# Patient Record
Sex: MALE | Race: WHITE | NOT HISPANIC OR LATINO | Employment: OTHER | URBAN - METROPOLITAN AREA
[De-identification: names, ages, dates, MRNs, and addresses within clinical notes are randomized per-mention and may not be internally consistent; named-entity substitution may affect disease eponyms.]

---

## 2023-07-07 DIAGNOSIS — F41.9 ANXIETY: Primary | ICD-10-CM

## 2023-07-07 DIAGNOSIS — K21.9 GASTROESOPHAGEAL REFLUX DISEASE WITHOUT ESOPHAGITIS: ICD-10-CM

## 2023-07-07 DIAGNOSIS — R35.0 BENIGN PROSTATIC HYPERPLASIA WITH URINARY FREQUENCY: ICD-10-CM

## 2023-07-07 DIAGNOSIS — I10 PRIMARY HYPERTENSION: ICD-10-CM

## 2023-07-07 DIAGNOSIS — N40.1 BENIGN PROSTATIC HYPERPLASIA WITH URINARY FREQUENCY: ICD-10-CM

## 2023-07-07 DIAGNOSIS — E78.2 MIXED HYPERLIPIDEMIA: ICD-10-CM

## 2023-07-07 RX ORDER — ESCITALOPRAM OXALATE 20 MG/1
20 TABLET ORAL DAILY
Qty: 90 TABLET | Refills: 3 | Status: SHIPPED | OUTPATIENT
Start: 2023-07-07 | End: 2024-07-06

## 2023-07-07 RX ORDER — ATENOLOL 25 MG/1
TABLET ORAL
Qty: 90 TABLET | Refills: 3 | Status: SHIPPED | OUTPATIENT
Start: 2023-07-07 | End: 2024-04-01

## 2023-07-07 RX ORDER — OMEPRAZOLE 20 MG/1
20 CAPSULE, DELAYED RELEASE ORAL DAILY
Qty: 90 CAPSULE | Refills: 3 | Status: SHIPPED | OUTPATIENT
Start: 2023-07-07 | End: 2024-04-01

## 2023-07-07 RX ORDER — ATORVASTATIN CALCIUM 80 MG/1
TABLET, FILM COATED ORAL
Qty: 90 TABLET | Refills: 3 | Status: SHIPPED | OUTPATIENT
Start: 2023-07-07 | End: 2024-04-01

## 2023-07-07 RX ORDER — TAMSULOSIN HYDROCHLORIDE 0.4 MG/1
0.4 CAPSULE ORAL DAILY
Qty: 90 CAPSULE | Refills: 3 | Status: SHIPPED | OUTPATIENT
Start: 2023-07-07 | End: 2024-04-01

## 2023-07-07 RX ORDER — FINASTERIDE 5 MG/1
5 TABLET, FILM COATED ORAL DAILY
Qty: 90 TABLET | Refills: 3 | Status: SHIPPED | OUTPATIENT
Start: 2023-07-07 | End: 2024-04-01

## 2023-07-21 ENCOUNTER — APPOINTMENT (OUTPATIENT)
Dept: PRIMARY CARE | Facility: CLINIC | Age: 83
End: 2023-07-21
Payer: MEDICARE

## 2023-09-07 DIAGNOSIS — F41.9 ANXIETY: Primary | ICD-10-CM

## 2023-09-07 RX ORDER — TRAZODONE HYDROCHLORIDE 50 MG/1
50-100 TABLET ORAL NIGHTLY PRN
Qty: 180 TABLET | Refills: 3 | Status: SHIPPED | OUTPATIENT
Start: 2023-09-07

## 2023-09-14 DIAGNOSIS — I10 PRIMARY HYPERTENSION: Primary | ICD-10-CM

## 2023-09-14 PROBLEM — M25.562 LEFT KNEE PAIN: Status: ACTIVE | Noted: 2023-09-14

## 2023-09-14 PROBLEM — F33.1 MAJOR DEPRESSIVE DISORDER, RECURRENT, MODERATE (MULTI): Status: ACTIVE | Noted: 2023-09-14

## 2023-09-14 PROBLEM — R21 SKIN RASH: Status: ACTIVE | Noted: 2023-09-14

## 2023-09-14 PROBLEM — F41.9 ANXIETY: Status: ACTIVE | Noted: 2023-09-14

## 2023-09-14 PROBLEM — H10.9 CONJUNCTIVITIS: Status: ACTIVE | Noted: 2023-09-14

## 2023-09-14 PROBLEM — H52.203 ASTIGMATISM OF BOTH EYES: Status: ACTIVE | Noted: 2023-09-14

## 2023-09-14 PROBLEM — R53.83 FATIGUE: Status: ACTIVE | Noted: 2023-09-14

## 2023-09-14 PROBLEM — H52.01 HYPERMETROPIA OF RIGHT EYE: Status: ACTIVE | Noted: 2023-09-14

## 2023-09-14 PROBLEM — H35.361 DRUSEN OF RIGHT MACULA: Status: ACTIVE | Noted: 2023-09-14

## 2023-09-14 PROBLEM — M54.9 MID BACK PAIN: Status: ACTIVE | Noted: 2023-09-14

## 2023-09-14 PROBLEM — Z98.61 CAD S/P PERCUTANEOUS CORONARY ANGIOPLASTY: Status: ACTIVE | Noted: 2023-09-14

## 2023-09-14 PROBLEM — H20.041: Status: ACTIVE | Noted: 2023-09-14

## 2023-09-14 PROBLEM — M17.12 PRIMARY OSTEOARTHRITIS OF LEFT KNEE: Status: ACTIVE | Noted: 2023-09-14

## 2023-09-14 PROBLEM — N40.0 BENIGN ENLARGEMENT OF PROSTATE: Status: ACTIVE | Noted: 2023-09-14

## 2023-09-14 PROBLEM — H50.22 HYPERTROPIA OF LEFT EYE: Status: ACTIVE | Noted: 2023-09-14

## 2023-09-14 PROBLEM — R91.1 PULMONARY NODULE SEEN ON IMAGING STUDY: Status: ACTIVE | Noted: 2023-09-14

## 2023-09-14 PROBLEM — H26.9 CATARACT: Status: ACTIVE | Noted: 2023-09-14

## 2023-09-14 PROBLEM — E78.5 DYSLIPIDEMIA: Status: ACTIVE | Noted: 2023-09-14

## 2023-09-14 PROBLEM — H52.4 BILATERAL PRESBYOPIA: Status: ACTIVE | Noted: 2023-09-14

## 2023-09-14 PROBLEM — H57.89 EYE DISCHARGE: Status: ACTIVE | Noted: 2023-09-14

## 2023-09-14 PROBLEM — K44.9 HIATAL HERNIA: Status: ACTIVE | Noted: 2023-09-14

## 2023-09-14 PROBLEM — H25.12 CATARACT, NUCLEAR SCLEROTIC, LEFT EYE: Status: ACTIVE | Noted: 2023-09-14

## 2023-09-14 PROBLEM — M70.52 PATELLAR BURSITIS OF LEFT KNEE: Status: ACTIVE | Noted: 2023-09-14

## 2023-09-14 PROBLEM — I25.10 ARTERIOSCLEROSIS OF CORONARY ARTERY: Status: ACTIVE | Noted: 2023-09-14

## 2023-09-14 PROBLEM — K51.90 ULCERATIVE COLITIS (MULTI): Status: ACTIVE | Noted: 2023-09-14

## 2023-09-14 PROBLEM — R07.81 RIB PAIN: Status: ACTIVE | Noted: 2023-09-14

## 2023-09-14 PROBLEM — K21.9 ESOPHAGEAL REFLUX: Status: ACTIVE | Noted: 2023-09-14

## 2023-09-14 PROBLEM — I25.10 CAD S/P PERCUTANEOUS CORONARY ANGIOPLASTY: Status: ACTIVE | Noted: 2023-09-14

## 2023-09-14 PROBLEM — J43.9 EMPHYSEMA/COPD (MULTI): Status: ACTIVE | Noted: 2023-09-14

## 2023-09-14 PROBLEM — Z96.1 PSEUDOPHAKIA OF BOTH EYES: Status: ACTIVE | Noted: 2023-09-14

## 2023-09-14 PROBLEM — K22.70 BARRETT'S ESOPHAGUS: Status: ACTIVE | Noted: 2023-09-14

## 2023-09-14 PROBLEM — H52.201 ASTIGMATISM OF RIGHT EYE: Status: ACTIVE | Noted: 2023-09-14

## 2023-09-14 PROBLEM — E78.49 FAMILIAL COMBINED HYPERLIPIDEMIA: Status: ACTIVE | Noted: 2023-09-14

## 2023-09-14 PROBLEM — F32.A DEPRESSION: Status: ACTIVE | Noted: 2023-09-14

## 2023-09-14 PROBLEM — G47.00 INSOMNIA: Status: ACTIVE | Noted: 2023-09-14

## 2023-09-14 RX ORDER — LISINOPRIL 10 MG/1
10 TABLET ORAL DAILY
Qty: 100 TABLET | Refills: 2 | Status: SHIPPED | OUTPATIENT
Start: 2023-09-14

## 2023-09-19 ENCOUNTER — OFFICE VISIT (OUTPATIENT)
Dept: PRIMARY CARE | Facility: CLINIC | Age: 83
End: 2023-09-19
Payer: MEDICARE

## 2023-09-19 VITALS
TEMPERATURE: 97.7 F | RESPIRATION RATE: 16 BRPM | WEIGHT: 183.4 LBS | OXYGEN SATURATION: 97 % | DIASTOLIC BLOOD PRESSURE: 77 MMHG | HEIGHT: 74 IN | HEART RATE: 62 BPM | BODY MASS INDEX: 23.54 KG/M2 | SYSTOLIC BLOOD PRESSURE: 133 MMHG

## 2023-09-19 DIAGNOSIS — I25.10 CAD S/P PERCUTANEOUS CORONARY ANGIOPLASTY: ICD-10-CM

## 2023-09-19 DIAGNOSIS — Z98.61 CAD S/P PERCUTANEOUS CORONARY ANGIOPLASTY: ICD-10-CM

## 2023-09-19 DIAGNOSIS — Z23 NEED FOR INFLUENZA VACCINATION: Primary | ICD-10-CM

## 2023-09-19 DIAGNOSIS — K51.90 ULCERATIVE COLITIS WITHOUT COMPLICATIONS, UNSPECIFIED LOCATION (MULTI): ICD-10-CM

## 2023-09-19 DIAGNOSIS — K22.719 BARRETT'S ESOPHAGUS WITH DYSPLASIA: ICD-10-CM

## 2023-09-19 DIAGNOSIS — Z12.5 SCREENING FOR PROSTATE CANCER: ICD-10-CM

## 2023-09-19 DIAGNOSIS — E78.5 DYSLIPIDEMIA: ICD-10-CM

## 2023-09-19 DIAGNOSIS — J43.9 PULMONARY EMPHYSEMA, UNSPECIFIED EMPHYSEMA TYPE (MULTI): ICD-10-CM

## 2023-09-19 DIAGNOSIS — R53.83 OTHER FATIGUE: ICD-10-CM

## 2023-09-19 DIAGNOSIS — F33.1 MAJOR DEPRESSIVE DISORDER, RECURRENT, MODERATE (MULTI): ICD-10-CM

## 2023-09-19 DIAGNOSIS — I10 PRIMARY HYPERTENSION: ICD-10-CM

## 2023-09-19 PROCEDURE — G0008 ADMIN INFLUENZA VIRUS VAC: HCPCS | Performed by: INTERNAL MEDICINE

## 2023-09-19 PROCEDURE — 3078F DIAST BP <80 MM HG: CPT | Performed by: INTERNAL MEDICINE

## 2023-09-19 PROCEDURE — 1036F TOBACCO NON-USER: CPT | Performed by: INTERNAL MEDICINE

## 2023-09-19 PROCEDURE — 1160F RVW MEDS BY RX/DR IN RCRD: CPT | Performed by: INTERNAL MEDICINE

## 2023-09-19 PROCEDURE — 3075F SYST BP GE 130 - 139MM HG: CPT | Performed by: INTERNAL MEDICINE

## 2023-09-19 PROCEDURE — 1157F ADVNC CARE PLAN IN RCRD: CPT | Performed by: INTERNAL MEDICINE

## 2023-09-19 PROCEDURE — 90662 IIV NO PRSV INCREASED AG IM: CPT | Performed by: INTERNAL MEDICINE

## 2023-09-19 PROCEDURE — 99215 OFFICE O/P EST HI 40 MIN: CPT | Performed by: INTERNAL MEDICINE

## 2023-09-19 PROCEDURE — 1159F MED LIST DOCD IN RCRD: CPT | Performed by: INTERNAL MEDICINE

## 2023-09-19 ASSESSMENT — ENCOUNTER SYMPTOMS
DIZZINESS: 0
LOSS OF SENSATION IN FEET: 0
POLYPHAGIA: 0
RESPIRATORY NEGATIVE: 1
DIARRHEA: 0
VOMITING: 0
BLOOD IN STOOL: 0
ABDOMINAL PAIN: 0
SHORTNESS OF BREATH: 0
POLYDIPSIA: 0
WHEEZING: 0
SORE THROAT: 0
WEAKNESS: 0
EYES NEGATIVE: 1
DIFFICULTY URINATING: 0
NERVOUS/ANXIOUS: 0
OCCASIONAL FEELINGS OF UNSTEADINESS: 0
PSYCHIATRIC NEGATIVE: 1
FREQUENCY: 0
CONFUSION: 0
EYE DISCHARGE: 0
UNEXPECTED WEIGHT CHANGE: 0
HEMATOLOGIC/LYMPHATIC NEGATIVE: 1
LIGHT-HEADEDNESS: 0
DEPRESSION: 0
PALPITATIONS: 0
BACK PAIN: 0
EYE PAIN: 0
COUGH: 0
COLOR CHANGE: 0
SLEEP DISTURBANCE: 0
NAUSEA: 0
ARTHRALGIAS: 1
MYALGIAS: 0
WOUND: 0
BRUISES/BLEEDS EASILY: 0
JOINT SWELLING: 0
HALLUCINATIONS: 0
APPETITE CHANGE: 0
ALLERGIC/IMMUNOLOGIC NEGATIVE: 1
VOICE CHANGE: 0
TREMORS: 0
SINUS PAIN: 0
NUMBNESS: 0
TROUBLE SWALLOWING: 0
STRIDOR: 0
CONSTIPATION: 0
CARDIOVASCULAR NEGATIVE: 1
DYSURIA: 0
HEADACHES: 0
NECK PAIN: 0
ADENOPATHY: 0
EYE REDNESS: 0
AGITATION: 0
FLANK PAIN: 0
NECK STIFFNESS: 0
SEIZURES: 0
SINUS PRESSURE: 0
CONSTITUTIONAL NEGATIVE: 1
GASTROINTESTINAL NEGATIVE: 1
ACTIVITY CHANGE: 0
ENDOCRINE NEGATIVE: 1
FACIAL ASYMMETRY: 0
SPEECH DIFFICULTY: 0
CHEST TIGHTNESS: 0

## 2023-09-19 ASSESSMENT — PATIENT HEALTH QUESTIONNAIRE - PHQ9
1. LITTLE INTEREST OR PLEASURE IN DOING THINGS: NOT AT ALL
SUM OF ALL RESPONSES TO PHQ9 QUESTIONS 1 AND 2: 0
2. FEELING DOWN, DEPRESSED OR HOPELESS: NOT AT ALL

## 2023-09-19 NOTE — PROGRESS NOTES
Subjective   Patient ID: Sidney Zapata is a 83 y.o. male who presents for Follow-up (Patient is here for a follow up./Patient has bilateral knee pain.).  HPI    Patient has been feeling pretty good and has been complaint with prescribed medications.    We reviewed and discussed details of recent blood work: CBC, CMP, TSH, Lipid panel,  Vit D, PSA done in 2022.  Results within acceptable range.    C/o joints pain and stiffness,especially knees, has been using cane, declined ortho consult at this time..    Patient sustained superficial wound on left hand which has been healing  without complications.      Patient has been taking care of his wife who suffers from multiple medical problems and needs 24/7 care.    Review of Systems   Constitutional: Negative.  Negative for activity change, appetite change and unexpected weight change.   HENT: Negative.  Negative for congestion, ear discharge, ear pain, hearing loss, mouth sores, nosebleeds, sinus pressure, sinus pain, sore throat, trouble swallowing and voice change.    Eyes: Negative.  Negative for pain, discharge, redness and visual disturbance.   Respiratory: Negative.  Negative for cough, chest tightness, shortness of breath, wheezing and stridor.    Cardiovascular: Negative.  Negative for chest pain, palpitations and leg swelling.   Gastrointestinal: Negative.  Negative for abdominal pain, blood in stool, constipation, diarrhea, nausea and vomiting.   Endocrine: Negative.  Negative for polydipsia, polyphagia and polyuria.   Genitourinary: Negative.  Negative for difficulty urinating, dysuria, flank pain, frequency and urgency.   Musculoskeletal:  Positive for arthralgias and gait problem. Negative for back pain, joint swelling, myalgias, neck pain and neck stiffness.   Skin: Negative.  Negative for color change, rash and wound.   Allergic/Immunologic: Negative.  Negative for environmental allergies, food allergies and immunocompromised state.   Neurological:   "Negative for dizziness, tremors, seizures, syncope, facial asymmetry, speech difficulty, weakness, light-headedness, numbness and headaches.   Hematological: Negative.  Negative for adenopathy. Does not bruise/bleed easily.   Psychiatric/Behavioral: Negative.  Negative for agitation, behavioral problems, confusion, hallucinations, sleep disturbance and suicidal ideas. The patient is not nervous/anxious.    All other systems reviewed and are negative.      Objective     Review of systems was performed and all systems were negative except what in HPI    /77   Pulse 62   Temp 36.5 °C (97.7 °F)   Resp 16   Ht 1.88 m (6' 2\")   Wt 83.2 kg (183 lb 6.4 oz)   SpO2 97%   BMI 23.55 kg/m²    Physical Exam  Vitals and nursing note reviewed.   Constitutional:       General: He is not in acute distress.     Appearance: Normal appearance.   HENT:      Head: Normocephalic and atraumatic.      Right Ear: External ear normal.      Left Ear: External ear normal.      Nose: Nose normal. No congestion or rhinorrhea.   Eyes:      General:         Right eye: No discharge.         Left eye: No discharge.      Extraocular Movements: Extraocular movements intact.      Conjunctiva/sclera: Conjunctivae normal.      Pupils: Pupils are equal, round, and reactive to light.   Cardiovascular:      Rate and Rhythm: Normal rate and regular rhythm.      Pulses: Normal pulses.      Heart sounds: Normal heart sounds. No murmur heard.     No friction rub. No gallop.   Pulmonary:      Effort: Pulmonary effort is normal. No respiratory distress.      Breath sounds: Normal breath sounds. No stridor. No wheezing, rhonchi or rales.   Chest:      Chest wall: No tenderness.   Abdominal:      General: Bowel sounds are normal.      Palpations: Abdomen is soft. There is no mass.      Tenderness: There is no abdominal tenderness. There is no guarding or rebound.   Musculoskeletal:         General: No swelling or deformity. Normal range of motion.      " Cervical back: Normal range of motion and neck supple. No rigidity or tenderness.      Right lower leg: No edema.      Left lower leg: No edema.   Lymphadenopathy:      Cervical: No cervical adenopathy.   Skin:     General: Skin is warm and dry.      Coloration: Skin is not jaundiced.      Findings: No bruising or erythema.   Neurological:      General: No focal deficit present.      Mental Status: He is alert and oriented to person, place, and time. Mental status is at baseline.      Cranial Nerves: No cranial nerve deficit.      Motor: No weakness.      Coordination: Coordination normal.      Gait: Gait normal.   Psychiatric:         Mood and Affect: Mood normal.         Behavior: Behavior normal.         Thought Content: Thought content normal.         Judgment: Judgment normal.         Assessment/Plan   Problem List Items Addressed This Visit          Cardiac and Vasculature    CAD S/P percutaneous coronary angioplasty     Clinically stable. F/up with cardiology.         Dyslipidemia     Continue statin.         Relevant Orders    Comprehensive Metabolic Panel    Lipid Panel    Hypertension     Controlled . Continue current treatment.            Gastrointestinal and Abdominal    Ashton's esophagus     Clinically stable. F/up with GI.         Ulcerative colitis (CMS/HCC)     Clinically stable. Symptoms controlled with treatment            Mental Health    Major depressive disorder, recurrent, moderate (CMS/HCC)     Clinically stable. Continue current treatment.            Pulmonary and Pneumonias    Emphysema/COPD (CMS/HCC)     Clinically stable. Will monitor.             Symptoms and Signs    Fatigue    Relevant Orders    CBC    TSH with reflex to Free T4 if abnormal     Other Visit Diagnoses       Need for influenza vaccination    -  Primary    Relevant Orders    Flu vaccine, quadrivalent, high-dose, preservative free, age 65y+ (FLUZONE) (Completed)    Screening for prostate cancer        Relevant Orders     Prostate Specific Antigen, Screen        It was a pleasure to see you today.  I would like to remind you about importance of a healthy lifestyle in order to improve your well-being and live longer.  Try to engage in physical activities for at least 150 minutes per week.  Eat about 10 servings of fruits and vegetables daily. My advice is 2 servings of fruits and 8 servings of vegetables.  For vegetables choose at least half of them green and at least half of them fresh.  Please avoid sugar, salt, fried food and saturated fat.    I spent a total of 40 minutes on the date of service for follow up visit, which included preparing to see the patient, face-to-face patient care, completing clinical documentation, obtaining and/or reviewing separately obtained history, performing a medically appropriate examination, counseling and educating the patient/family/caregiver, ordering medications, tests, or procedures, communicating with other health care providers (not separately reported), independently interpreting results (not separately reported), communicating results to the patient/family/caregiver, and care coordination (not separately reported).        F/up in 4 months or sooner if needed

## 2024-01-23 ENCOUNTER — APPOINTMENT (OUTPATIENT)
Dept: PRIMARY CARE | Facility: CLINIC | Age: 84
End: 2024-01-23
Payer: MEDICARE

## 2024-02-08 ENCOUNTER — OFFICE VISIT (OUTPATIENT)
Dept: PRIMARY CARE | Facility: CLINIC | Age: 84
End: 2024-02-08
Payer: MEDICARE

## 2024-02-08 VITALS
TEMPERATURE: 97.5 F | HEIGHT: 74 IN | WEIGHT: 178.6 LBS | OXYGEN SATURATION: 96 % | DIASTOLIC BLOOD PRESSURE: 67 MMHG | SYSTOLIC BLOOD PRESSURE: 106 MMHG | BODY MASS INDEX: 22.92 KG/M2 | HEART RATE: 76 BPM | RESPIRATION RATE: 16 BRPM

## 2024-02-08 DIAGNOSIS — B34.9 VIRAL SYNDROME: ICD-10-CM

## 2024-02-08 DIAGNOSIS — Z00.00 ROUTINE GENERAL MEDICAL EXAMINATION AT HEALTH CARE FACILITY: Primary | ICD-10-CM

## 2024-02-08 DIAGNOSIS — J43.9 PULMONARY EMPHYSEMA, UNSPECIFIED EMPHYSEMA TYPE (MULTI): ICD-10-CM

## 2024-02-08 DIAGNOSIS — N40.1 BENIGN PROSTATIC HYPERPLASIA WITH URINARY FREQUENCY: ICD-10-CM

## 2024-02-08 DIAGNOSIS — I10 PRIMARY HYPERTENSION: ICD-10-CM

## 2024-02-08 DIAGNOSIS — K51.90 ULCERATIVE COLITIS WITHOUT COMPLICATIONS, UNSPECIFIED LOCATION (MULTI): ICD-10-CM

## 2024-02-08 DIAGNOSIS — R35.0 BENIGN PROSTATIC HYPERPLASIA WITH URINARY FREQUENCY: ICD-10-CM

## 2024-02-08 DIAGNOSIS — F33.1 MAJOR DEPRESSIVE DISORDER, RECURRENT, MODERATE (MULTI): ICD-10-CM

## 2024-02-08 DIAGNOSIS — J06.9 UPPER RESPIRATORY TRACT INFECTION, UNSPECIFIED TYPE: ICD-10-CM

## 2024-02-08 DIAGNOSIS — Z98.61 CAD S/P PERCUTANEOUS CORONARY ANGIOPLASTY: ICD-10-CM

## 2024-02-08 DIAGNOSIS — I25.10 CAD S/P PERCUTANEOUS CORONARY ANGIOPLASTY: ICD-10-CM

## 2024-02-08 PROCEDURE — 87634 RSV DNA/RNA AMP PROBE: CPT

## 2024-02-08 PROCEDURE — 1159F MED LIST DOCD IN RCRD: CPT | Performed by: INTERNAL MEDICINE

## 2024-02-08 PROCEDURE — 1123F ACP DISCUSS/DSCN MKR DOCD: CPT | Performed by: INTERNAL MEDICINE

## 2024-02-08 PROCEDURE — 3074F SYST BP LT 130 MM HG: CPT | Performed by: INTERNAL MEDICINE

## 2024-02-08 PROCEDURE — 99214 OFFICE O/P EST MOD 30 MIN: CPT | Performed by: INTERNAL MEDICINE

## 2024-02-08 PROCEDURE — 1160F RVW MEDS BY RX/DR IN RCRD: CPT | Performed by: INTERNAL MEDICINE

## 2024-02-08 PROCEDURE — 1170F FXNL STATUS ASSESSED: CPT | Performed by: INTERNAL MEDICINE

## 2024-02-08 PROCEDURE — 1158F ADVNC CARE PLAN TLK DOCD: CPT | Performed by: INTERNAL MEDICINE

## 2024-02-08 PROCEDURE — 99397 PER PM REEVAL EST PAT 65+ YR: CPT | Performed by: INTERNAL MEDICINE

## 2024-02-08 PROCEDURE — 1157F ADVNC CARE PLAN IN RCRD: CPT | Performed by: INTERNAL MEDICINE

## 2024-02-08 PROCEDURE — G0439 PPPS, SUBSEQ VISIT: HCPCS | Performed by: INTERNAL MEDICINE

## 2024-02-08 PROCEDURE — 87636 SARSCOV2 & INF A&B AMP PRB: CPT

## 2024-02-08 PROCEDURE — 3078F DIAST BP <80 MM HG: CPT | Performed by: INTERNAL MEDICINE

## 2024-02-08 PROCEDURE — 1036F TOBACCO NON-USER: CPT | Performed by: INTERNAL MEDICINE

## 2024-02-08 RX ORDER — AZITHROMYCIN 250 MG/1
TABLET, FILM COATED ORAL
Qty: 6 TABLET | Refills: 0 | Status: SHIPPED | OUTPATIENT
Start: 2024-02-08 | End: 2024-02-15 | Stop reason: WASHOUT

## 2024-02-08 ASSESSMENT — ENCOUNTER SYMPTOMS
APPETITE CHANGE: 0
COLOR CHANGE: 0
HEMATOLOGIC/LYMPHATIC NEGATIVE: 1
ARTHRALGIAS: 1
SINUS PRESSURE: 0
NAUSEA: 0
DYSURIA: 0
DIZZINESS: 0
CHILLS: 1
WEAKNESS: 0
NERVOUS/ANXIOUS: 0
VOMITING: 0
TROUBLE SWALLOWING: 0
BRUISES/BLEEDS EASILY: 0
OCCASIONAL FEELINGS OF UNSTEADINESS: 0
SEIZURES: 0
HEADACHES: 0
NEUROLOGICAL NEGATIVE: 1
ENDOCRINE NEGATIVE: 1
FLANK PAIN: 0
SPEECH DIFFICULTY: 0
WHEEZING: 0
STRIDOR: 0
CHEST TIGHTNESS: 0
EYE PAIN: 0
ACTIVITY CHANGE: 0
EYE REDNESS: 0
SINUS PAIN: 0
UNEXPECTED WEIGHT CHANGE: 0
CONSTIPATION: 0
PSYCHIATRIC NEGATIVE: 1
COUGH: 1
ADENOPATHY: 0
GASTROINTESTINAL NEGATIVE: 1
LIGHT-HEADEDNESS: 0
BACK PAIN: 0
PALPITATIONS: 0
VOICE CHANGE: 0
BLOOD IN STOOL: 0
DIARRHEA: 0
SHORTNESS OF BREATH: 0
NECK PAIN: 0
FATIGUE: 1
SLEEP DISTURBANCE: 0
TREMORS: 0
CONFUSION: 0
POLYPHAGIA: 0
WOUND: 0
MYALGIAS: 1
AGITATION: 0
ABDOMINAL PAIN: 0
HALLUCINATIONS: 0
SORE THROAT: 1
NECK STIFFNESS: 0
DIFFICULTY URINATING: 0
FACIAL ASYMMETRY: 0
DEPRESSION: 0
NUMBNESS: 0
LOSS OF SENSATION IN FEET: 0
FREQUENCY: 0
JOINT SWELLING: 0
POLYDIPSIA: 0
EYES NEGATIVE: 1
CARDIOVASCULAR NEGATIVE: 1
EYE DISCHARGE: 0
ALLERGIC/IMMUNOLOGIC NEGATIVE: 1

## 2024-02-08 ASSESSMENT — ACTIVITIES OF DAILY LIVING (ADL)
DRESSING: INDEPENDENT
BATHING: INDEPENDENT
GROCERY_SHOPPING: INDEPENDENT
MANAGING_FINANCES: INDEPENDENT
DOING_HOUSEWORK: INDEPENDENT
TAKING_MEDICATION: INDEPENDENT

## 2024-02-08 ASSESSMENT — PATIENT HEALTH QUESTIONNAIRE - PHQ9
SUM OF ALL RESPONSES TO PHQ9 QUESTIONS 1 AND 2: 0
1. LITTLE INTEREST OR PLEASURE IN DOING THINGS: NOT AT ALL
2. FEELING DOWN, DEPRESSED OR HOPELESS: NOT AT ALL

## 2024-02-08 NOTE — ASSESSMENT & PLAN NOTE
Start Z-lizbeth as directed. You can take OTC (over the counter) Tylenol up to 2000 mg daily in divided doses as needed and other OTC medications for symptoms control. Go to ER if feeling short of breath or having other worrisome symptoms (e.g. not able to keep food and fluids down, feeling dehydrated, etc).

## 2024-02-08 NOTE — PROGRESS NOTES
Subjective   Reason for Visit: Sidney Zapata is an 84 y.o. male here for a Medicare Wellness visit.     Past Medical, Surgical, and Family History reviewed and updated in chart.    Reviewed all medications by prescribing practitioner or clinical pharmacist (such as prescriptions, OTCs, herbal therapies and supplements) and documented in the medical record.    [facility]    Patient Care Team:  Alessia Taylor MD PhD as PCP - General  Alessia Taylor MD PhD as PCP - [employer] PCP       Patient comes for Medicare Wellness, Physical Exam and Follow up visit.     Patient has not been feeling for last few days, feeling tired having body aches.  C/o sinus congestion, sore throat, chest congestion, cough.  No fever, had chills.  Patient was treated with antibiotic at the end of December due to URI. At that time was ruled out for COVID/RSV.    He has been complaint with prescribed medications.    We reviewed blood work including CBC, CMP, TSH, Lipid Panel, HbA1c, Vit D level and PSA done in 2023.  Results within acceptable range.      Pneumonia Vacc: declined due to previous reaction  Advance Directives: Advance Directives: Healthcare  POA not on file. Patient advised to complete forms and bring/mail to the office.   POA discussed, confirmed and documented in patient's  EPIC record.     Patient has been taking care of his wife who suffers from multiple medical problems and needs 24/7 care.     Escitalopram helps with depression and anxiety symptoms.      CT chest in 2016 showed stable, small pulmonary nodules and large hiatal hernia.      His colonoscopy and EGD in 2016 were acceptable. there was no evidence of Ashton's esophagus.   Symptoms of ulcerative colitis have been controlled with medication.   Patient follows with GI Dr. Goldsmith regarding Ulcerative colitis.  Follows with  cardiology NP Tracy Schwab.  Patient has been compliant with his medications, his BP has been well controlled. CAD-stable.  "He had coronary stent put in 12 years ago.     BPH symptoms have been controlled with Tamsulosin.     Review of Systems   Constitutional:  Positive for chills and fatigue. Negative for activity change, appetite change and unexpected weight change.   HENT:  Positive for congestion and sore throat. Negative for ear discharge, ear pain, hearing loss, mouth sores, nosebleeds, sinus pressure, sinus pain, trouble swallowing and voice change.    Eyes: Negative.  Negative for pain, discharge, redness and visual disturbance.   Respiratory:  Positive for cough. Negative for chest tightness, shortness of breath, wheezing and stridor.    Cardiovascular: Negative.  Negative for chest pain, palpitations and leg swelling.   Gastrointestinal: Negative.  Negative for abdominal pain, blood in stool, constipation, diarrhea, nausea and vomiting.   Endocrine: Negative.  Negative for polydipsia, polyphagia and polyuria.   Genitourinary: Negative.  Negative for difficulty urinating, dysuria, flank pain, frequency and urgency.   Musculoskeletal:  Positive for arthralgias and myalgias. Negative for back pain, gait problem, joint swelling, neck pain and neck stiffness.   Skin: Negative.  Negative for color change, rash and wound.   Allergic/Immunologic: Negative.  Negative for environmental allergies, food allergies and immunocompromised state.   Neurological: Negative.  Negative for dizziness, tremors, seizures, syncope, facial asymmetry, speech difficulty, weakness, light-headedness, numbness and headaches.   Hematological: Negative.  Negative for adenopathy. Does not bruise/bleed easily.   Psychiatric/Behavioral: Negative.  Negative for agitation, behavioral problems, confusion, hallucinations, sleep disturbance and suicidal ideas. The patient is not nervous/anxious.    All other systems reviewed and are negative.      Objective   Vitals:  /67   Pulse 76   Temp 36.4 °C (97.5 °F)   Resp 16   Ht 1.88 m (6' 2\")   Wt 81 kg (178 lb " 9.6 oz)   SpO2 96%   BMI 22.93 kg/m²       Physical Exam  Vitals and nursing note reviewed.   Constitutional:       General: He is not in acute distress.     Appearance: Normal appearance.   HENT:      Head: Normocephalic and atraumatic.      Right Ear: Tympanic membrane, ear canal and external ear normal.      Left Ear: Tympanic membrane, ear canal and external ear normal.      Nose: Nose normal. No congestion or rhinorrhea.      Mouth/Throat:      Mouth: Mucous membranes are moist.      Comments: PND noted  Eyes:      General:         Right eye: No discharge.         Left eye: No discharge.      Extraocular Movements: Extraocular movements intact.      Conjunctiva/sclera: Conjunctivae normal.      Pupils: Pupils are equal, round, and reactive to light.   Cardiovascular:      Rate and Rhythm: Normal rate and regular rhythm.      Pulses: Normal pulses.      Heart sounds: Normal heart sounds. No murmur heard.     No friction rub. No gallop.   Pulmonary:      Effort: Pulmonary effort is normal. No respiratory distress.      Breath sounds: Normal breath sounds. No stridor. No wheezing, rhonchi or rales.   Chest:      Chest wall: No tenderness.   Abdominal:      General: Bowel sounds are normal.      Palpations: Abdomen is soft. There is no mass.      Tenderness: There is no abdominal tenderness. There is no guarding or rebound.   Musculoskeletal:         General: No swelling or deformity. Normal range of motion.      Cervical back: Normal range of motion and neck supple. No rigidity or tenderness.      Right lower leg: No edema.      Left lower leg: No edema.   Lymphadenopathy:      Cervical: No cervical adenopathy.   Skin:     General: Skin is warm and dry.      Coloration: Skin is not jaundiced.      Findings: No bruising or erythema.   Neurological:      General: No focal deficit present.      Mental Status: He is alert and oriented to person, place, and time. Mental status is at baseline.      Cranial Nerves: No  cranial nerve deficit.      Motor: No weakness.      Coordination: Coordination normal.      Gait: Gait normal.   Psychiatric:         Mood and Affect: Mood normal.         Behavior: Behavior normal.         Thought Content: Thought content normal.         Judgment: Judgment normal.         Assessment/Plan   Problem List Items Addressed This Visit          Cardiac and Vasculature    CAD S/P percutaneous coronary angioplasty    Current Assessment & Plan     Clinically stable. F/up with cardiology.         Hypertension    Current Assessment & Plan     Controlled . Continue current treatment.            ENT    Upper respiratory tract infection    Current Assessment & Plan     Start Z-lizbeth as directed. You can take OTC (over the counter) Tylenol up to 2000 mg daily in divided doses as needed and other OTC medications for symptoms control. Go to ER if feeling short of breath or having other worrisome symptoms (e.g. not able to keep food and fluids down, feeling dehydrated, etc).         Relevant Medications    azithromycin (Zithromax) 250 mg tablet       Gastrointestinal and Abdominal    Ulcerative colitis (CMS/HCC)    Current Assessment & Plan     Clinically stable. F/up with GI.            Genitourinary and Reproductive    Benign enlargement of prostate    Current Assessment & Plan     Clinically stable. F/up with urology.             Health Encounters    Routine general medical examination at health care facility - Primary       Infectious Diseases    Viral syndrome    Relevant Orders    RSV PCR    Sars-CoV-2 PCR    Influenza A, and B PCR       Mental Health    Major depressive disorder, recurrent, moderate (CMS/HCC)    Current Assessment & Plan     Clinically stable. Will monitor.             Pulmonary and Pneumonias    Emphysema/COPD (CMS/HCC)    Current Assessment & Plan     Clinically stable. Will monitor.         It was a pleasure to see you today.  I would like to remind you about importance of a healthy  lifestyle in order to improve your well-being and live longer.  Try to engage in physical activities for at least 150 minutes per week.  Eat about 10 servings of fruits and vegetables daily. My advice is 2 servings of fruits and 8 servings of vegetables.  For vegetables choose at least half of them green and at least half of them fresh.  Please avoid sugar, salt, fried food and saturated fat.    I spent a total of 30 minutes on the date of service for follow up visit, which included preparing to see the patient, face-to-face patient care, completing clinical documentation, obtaining and/or reviewing separately obtained history, performing a medically appropriate examination, counseling and educating the patient/family/caregiver, ordering medications, tests, or procedures, communicating with other health care providers (not separately reported), independently interpreting results (not separately reported), communicating results to the patient/family/caregiver, and care coordination (not separately reported).    F/up in 6 months or sooner if needed.

## 2024-02-09 ENCOUNTER — TELEPHONE (OUTPATIENT)
Dept: PRIMARY CARE | Facility: CLINIC | Age: 84
End: 2024-02-09
Payer: MEDICARE

## 2024-02-09 DIAGNOSIS — J10.1 INFLUENZA A: Primary | ICD-10-CM

## 2024-02-09 LAB
FLUAV RNA RESP QL NAA+PROBE: DETECTED
FLUBV RNA RESP QL NAA+PROBE: NOT DETECTED
RSV RNA RESP QL NAA+PROBE: NOT DETECTED
SARS-COV-2 RNA RESP QL NAA+PROBE: NOT DETECTED

## 2024-02-09 RX ORDER — OSELTAMIVIR PHOSPHATE 75 MG/1
75 CAPSULE ORAL 2 TIMES DAILY
Qty: 10 CAPSULE | Refills: 0 | Status: SHIPPED | OUTPATIENT
Start: 2024-02-09 | End: 2024-02-15 | Stop reason: WASHOUT

## 2024-02-09 NOTE — TELEPHONE ENCOUNTER
Patients flu test came back positive, Covid test was negative and RSV is still in process. Please advise what patient should be doing for the flu.

## 2024-02-15 ENCOUNTER — OFFICE VISIT (OUTPATIENT)
Dept: CARDIOLOGY | Facility: CLINIC | Age: 84
End: 2024-02-15
Payer: MEDICARE

## 2024-02-15 VITALS
WEIGHT: 178.4 LBS | BODY MASS INDEX: 22.18 KG/M2 | HEIGHT: 75 IN | OXYGEN SATURATION: 98 % | DIASTOLIC BLOOD PRESSURE: 60 MMHG | RESPIRATION RATE: 18 BRPM | SYSTOLIC BLOOD PRESSURE: 102 MMHG | HEART RATE: 61 BPM

## 2024-02-15 DIAGNOSIS — I10 PRIMARY HYPERTENSION: ICD-10-CM

## 2024-02-15 DIAGNOSIS — Z98.61 CAD S/P PERCUTANEOUS CORONARY ANGIOPLASTY: ICD-10-CM

## 2024-02-15 DIAGNOSIS — E78.5 DYSLIPIDEMIA: Primary | ICD-10-CM

## 2024-02-15 DIAGNOSIS — I25.10 CAD S/P PERCUTANEOUS CORONARY ANGIOPLASTY: ICD-10-CM

## 2024-02-15 PROCEDURE — 1159F MED LIST DOCD IN RCRD: CPT | Performed by: NURSE PRACTITIONER

## 2024-02-15 PROCEDURE — 3074F SYST BP LT 130 MM HG: CPT | Performed by: NURSE PRACTITIONER

## 2024-02-15 PROCEDURE — 3078F DIAST BP <80 MM HG: CPT | Performed by: NURSE PRACTITIONER

## 2024-02-15 PROCEDURE — 1036F TOBACCO NON-USER: CPT | Performed by: NURSE PRACTITIONER

## 2024-02-15 PROCEDURE — 99214 OFFICE O/P EST MOD 30 MIN: CPT | Performed by: NURSE PRACTITIONER

## 2024-02-15 PROCEDURE — 1157F ADVNC CARE PLAN IN RCRD: CPT | Performed by: NURSE PRACTITIONER

## 2024-02-15 PROCEDURE — 1123F ACP DISCUSS/DSCN MKR DOCD: CPT | Performed by: NURSE PRACTITIONER

## 2024-02-15 PROCEDURE — 1160F RVW MEDS BY RX/DR IN RCRD: CPT | Performed by: NURSE PRACTITIONER

## 2024-02-15 PROCEDURE — 93000 ELECTROCARDIOGRAM COMPLETE: CPT | Performed by: NURSE PRACTITIONER

## 2024-02-15 NOTE — PATIENT INSTRUCTIONS
- Echocardiogram (ultrasound of your heart) to assess the function as well as for any valve abnormalities  - follow up with me after    It was my pleasure to meet you. I look forward to being your cardiac Nurse Practitioner. I am a huge believer in communicating with my patients. Please contact me at any time, if anything is not clear to you regarding anything we have discussed, or if new questions occur to you.

## 2024-02-15 NOTE — PROGRESS NOTES
Name : Sidney Zapata   : 1940   MRN : [id number]   ENC Date : 02/15/2024    CC: Annual Exam, Coronary Artery Disease, Hypertension, and DLD     HPI:    Mr Zapata is a 84 y.o. M with PMHx sig for CAD s/p PCI & stenting of RCA , OM1 & D1 (2008), HTN, DLD, COPD, Ashton's Esophagus who presents with his Wife Keynana today for annual cardiovascular follow up.      He has done very well since his last visit. Reports no major health issues and has had no hospitalizations. He is compliant with his medications and reports no side effects. He has been physically active, caretaker of wife who is in wheelchair & split level. Denies any chest pain, pressure, SOB/CAMPA, PND, orthopnea, LE edema, palpitations, lightheadedness, dizziness, or syncope.     Knees are going but stays active by pushing wife in chair, shopping, going up & down stairs. Wife has a lot of health issues & is on dialysis.    ROS: unless otherwise noted in the history of present illness, all other systems were reviewed and they are negative for complaints     Allergies:  Prevnar 13 (pf) [pneumoc 13-drea conj-dip cr(pf)] and Penicillins    Current Outpatient Medications   Medication Instructions    atenolol (Tenormin) 25 mg tablet TAKE 1 TABLET BY MOUTH  DAILY    atorvastatin (Lipitor) 80 mg tablet TAKE 1 TABLET BY MOUTH  DAILY    escitalopram (LEXAPRO) 20 mg, oral, Daily    finasteride (PROSCAR) 5 mg, oral, Daily    lisinopril 10 mg, oral, Daily    omeprazole (PRILOSEC) 20 mg, oral, Daily    tamsulosin (FLOMAX) 0.4 mg, oral, Daily    traZODone (DESYREL)  mg, oral, Nightly PRN, FOR INSOMNIA        Last Labs:  CBC  Lab Results   Component Value Date    WBC 6.2 10/05/2022    HGB 13.0 (L) 10/05/2022    HCT 39.8 (L) 10/05/2022    MCV 97 10/05/2022     10/05/2022       CMP  Lab Results   Component Value Date    CALCIUM 8.9 10/05/2022    PROT 6.5 10/05/2022    ALBUMIN 3.9 10/05/2022    AST 25 10/05/2022    ALT 31 10/05/2022    ALKPHOS 61  "10/05/2022    BILITOT 0.9 10/05/2022       BMP   Lab Results   Component Value Date     10/05/2022    K 4.1 10/05/2022     10/05/2022    CO2 30 10/05/2022    GLUCOSE 96 10/05/2022    BUN 19 10/05/2022    CREATININE 1.12 10/05/2022       LIPID PANEL   Lab Results   Component Value Date    CHOL 97 10/05/2022    TRIG 53 10/05/2022    HDL 43.0 10/05/2022    CHHDL 2.3 10/05/2022    LDLF 43 10/05/2022    VLDL 11 10/05/2022       RENAL FUNCTION PANEL   Lab Results   Component Value Date    GLUCOSE 96 10/05/2022     10/05/2022    K 4.1 10/05/2022     10/05/2022    CO2 30 10/05/2022    ANIONGAP 9 (L) 10/05/2022    BUN 19 10/05/2022    CREATININE 1.12 10/05/2022    GFRMALE 65 10/05/2022    CALCIUM 8.9 10/05/2022    ALBUMIN 3.9 10/05/2022        No results found for: \"BNP\", \"HGBA1C\"    Last Recorded Vitals:  Vitals:    02/15/24 1006   BP: 102/60   BP Location: Right arm   Patient Position: Sitting   Pulse: 61   Resp: 18   SpO2: 98%   Weight: 80.9 kg (178 lb 6.4 oz)   Height: 1.905 m (6' 3\")     Physical Exam:  On exam Mr. Zapata appears his stated age, is alert and oriented x3, and in no acute distress. His sclera are anicteric and his oropharynx has moist mucous membranes. His neck is supple and without thyromegaly. The JVP is ~5 cm of water above the right atrium. His cardiac exam has regular rhythm, normal S1, S2. No S3/4. There are no murmurs. His lungs are clear to auscultation bilaterally and there is no dullness to percussion. His abdomen is soft, nontender with normoactive bowel sounds. There is no HJR. The extremities are warm and without edema. The skin is dry. There is no rash present. The distal pulses are 2-3+ in all four extremities. His mood and affect are appropriate for todays encounter.     Last Cardiology Tests:  EKG 2/15/23: Sinus Bradycardia, HR 54 bpm, 1st degree AVB  Echo 2008     Assessment/Plan:  1. CAD s/p PCI & stenting of RCA , OM1 & D1 (01/2008). EKG is baseline. No " angina  - c/w Atorvastatin 80mg QD  - c/w Atenolol 25mg every day  - should be on ASA. Discuss on follow up  - echocardiogram for surveillance as last one was in 2008     2. Hypertension. Well controlled on current regimen     3. DLD. Tolerating statin well     Virtual follow up after echo, discuss initiation of ASA too. If good then follow up in 1 year    Tracy M Schwab, APRN-CNP

## 2024-02-28 ENCOUNTER — LAB (OUTPATIENT)
Dept: LAB | Facility: LAB | Age: 84
End: 2024-02-28
Payer: MEDICARE

## 2024-02-28 DIAGNOSIS — Z12.5 SCREENING FOR PROSTATE CANCER: ICD-10-CM

## 2024-02-28 DIAGNOSIS — R53.83 OTHER FATIGUE: ICD-10-CM

## 2024-02-28 DIAGNOSIS — E78.5 DYSLIPIDEMIA: ICD-10-CM

## 2024-02-28 LAB
ALBUMIN SERPL BCP-MCNC: 4 G/DL (ref 3.4–5)
ALP SERPL-CCNC: 71 U/L (ref 33–136)
ALT SERPL W P-5'-P-CCNC: 16 U/L (ref 10–52)
ANION GAP SERPL CALC-SCNC: 12 MMOL/L (ref 10–20)
AST SERPL W P-5'-P-CCNC: 21 U/L (ref 9–39)
BILIRUB SERPL-MCNC: 0.9 MG/DL (ref 0–1.2)
BUN SERPL-MCNC: 18 MG/DL (ref 6–23)
CALCIUM SERPL-MCNC: 9.1 MG/DL (ref 8.6–10.3)
CHLORIDE SERPL-SCNC: 103 MMOL/L (ref 98–107)
CHOLEST SERPL-MCNC: 107 MG/DL (ref 0–199)
CHOLESTEROL/HDL RATIO: 2.3
CO2 SERPL-SCNC: 29 MMOL/L (ref 21–32)
CREAT SERPL-MCNC: 1.09 MG/DL (ref 0.5–1.3)
EGFRCR SERPLBLD CKD-EPI 2021: 67 ML/MIN/1.73M*2
ERYTHROCYTE [DISTWIDTH] IN BLOOD BY AUTOMATED COUNT: 12.9 % (ref 11.5–14.5)
GLUCOSE SERPL-MCNC: 100 MG/DL (ref 74–99)
HCT VFR BLD AUTO: 40.7 % (ref 41–52)
HDLC SERPL-MCNC: 46.3 MG/DL
HGB BLD-MCNC: 12.9 G/DL (ref 13.5–17.5)
LDLC SERPL CALC-MCNC: 47 MG/DL
MCH RBC QN AUTO: 30.5 PG (ref 26–34)
MCHC RBC AUTO-ENTMCNC: 31.7 G/DL (ref 32–36)
MCV RBC AUTO: 96 FL (ref 80–100)
NON HDL CHOLESTEROL: 61 MG/DL (ref 0–149)
NRBC BLD-RTO: 0 /100 WBCS (ref 0–0)
PLATELET # BLD AUTO: 209 X10*3/UL (ref 150–450)
POTASSIUM SERPL-SCNC: 4 MMOL/L (ref 3.5–5.3)
PROT SERPL-MCNC: 6.6 G/DL (ref 6.4–8.2)
PSA SERPL-MCNC: 1.26 NG/ML
RBC # BLD AUTO: 4.23 X10*6/UL (ref 4.5–5.9)
SODIUM SERPL-SCNC: 140 MMOL/L (ref 136–145)
TRIGL SERPL-MCNC: 70 MG/DL (ref 0–149)
TSH SERPL-ACNC: 1.91 MIU/L (ref 0.44–3.98)
VLDL: 14 MG/DL (ref 0–40)
WBC # BLD AUTO: 6.1 X10*3/UL (ref 4.4–11.3)

## 2024-02-28 PROCEDURE — 80061 LIPID PANEL: CPT

## 2024-02-28 PROCEDURE — 80053 COMPREHEN METABOLIC PANEL: CPT

## 2024-02-28 PROCEDURE — 85027 COMPLETE CBC AUTOMATED: CPT

## 2024-02-28 PROCEDURE — 84443 ASSAY THYROID STIM HORMONE: CPT

## 2024-02-28 PROCEDURE — G0103 PSA SCREENING: HCPCS

## 2024-02-28 PROCEDURE — 36415 COLL VENOUS BLD VENIPUNCTURE: CPT

## 2024-02-29 ENCOUNTER — TELEPHONE (OUTPATIENT)
Dept: PRIMARY CARE | Facility: CLINIC | Age: 84
End: 2024-02-29
Payer: MEDICARE

## 2024-02-29 NOTE — TELEPHONE ENCOUNTER
----- Message from Alessia Taylor MD PhD sent at 2/28/2024  8:40 PM EST -----  Please mail results:  Your recent lab work was acceptable. All results may not be within normal range but they are not clinically significant at this time and do not require change in your therapy. We will discuss details during your next office visit. Please keep your next appointment as scheduled. Dr. Sparrow

## 2024-03-28 ENCOUNTER — HOSPITAL ENCOUNTER (OUTPATIENT)
Dept: CARDIOLOGY | Facility: HOSPITAL | Age: 84
Discharge: HOME | End: 2024-03-28
Payer: MEDICARE

## 2024-03-28 DIAGNOSIS — Z98.61 CAD S/P PERCUTANEOUS CORONARY ANGIOPLASTY: ICD-10-CM

## 2024-03-28 DIAGNOSIS — I25.10 CAD S/P PERCUTANEOUS CORONARY ANGIOPLASTY: ICD-10-CM

## 2024-03-28 PROCEDURE — 93306 TTE W/DOPPLER COMPLETE: CPT

## 2024-03-28 PROCEDURE — 93306 TTE W/DOPPLER COMPLETE: CPT | Performed by: INTERNAL MEDICINE

## 2024-03-30 LAB
AORTIC VALVE MEAN GRADIENT: 3 MMHG
AORTIC VALVE PEAK VELOCITY: 1.12 M/S
AV PEAK GRADIENT: 5 MMHG
AVA (PEAK VEL): 2.78 CM2
AVA (VTI): 2.47 CM2
EJECTION FRACTION APICAL 4 CHAMBER: 55.1
EJECTION FRACTION: 59 %
LEFT ATRIUM VOLUME AREA LENGTH INDEX BSA: 22.9 ML/M2
LEFT VENTRICLE INTERNAL DIMENSION DIASTOLE: 3.4 CM (ref 3.5–6)
LEFT VENTRICULAR OUTFLOW TRACT DIAMETER: 2 CM
MITRAL VALVE E/A RATIO: 0.91
RIGHT VENTRICLE FREE WALL PEAK S': 8.92 CM/S
TRICUSPID ANNULAR PLANE SYSTOLIC EXCURSION: 1.5 CM

## 2024-04-01 DIAGNOSIS — N40.1 BENIGN PROSTATIC HYPERPLASIA WITH URINARY FREQUENCY: ICD-10-CM

## 2024-04-01 DIAGNOSIS — R35.0 BENIGN PROSTATIC HYPERPLASIA WITH URINARY FREQUENCY: ICD-10-CM

## 2024-04-01 DIAGNOSIS — E78.2 MIXED HYPERLIPIDEMIA: ICD-10-CM

## 2024-04-01 DIAGNOSIS — K21.9 GASTROESOPHAGEAL REFLUX DISEASE WITHOUT ESOPHAGITIS: ICD-10-CM

## 2024-04-01 DIAGNOSIS — I10 PRIMARY HYPERTENSION: ICD-10-CM

## 2024-04-01 RX ORDER — ATORVASTATIN CALCIUM 80 MG/1
80 TABLET, FILM COATED ORAL DAILY
Qty: 100 TABLET | Refills: 2 | Status: SHIPPED | OUTPATIENT
Start: 2024-04-01

## 2024-04-01 RX ORDER — FINASTERIDE 5 MG/1
5 TABLET, FILM COATED ORAL DAILY
Qty: 100 TABLET | Refills: 2 | Status: SHIPPED | OUTPATIENT
Start: 2024-04-01

## 2024-04-01 RX ORDER — TAMSULOSIN HYDROCHLORIDE 0.4 MG/1
0.4 CAPSULE ORAL DAILY
Qty: 100 CAPSULE | Refills: 2 | Status: SHIPPED | OUTPATIENT
Start: 2024-04-01

## 2024-04-01 RX ORDER — ATENOLOL 25 MG/1
TABLET ORAL
Qty: 100 TABLET | Refills: 2 | Status: SHIPPED | OUTPATIENT
Start: 2024-04-01 | End: 2025-04-01

## 2024-04-01 RX ORDER — OMEPRAZOLE 20 MG/1
20 CAPSULE, DELAYED RELEASE ORAL DAILY
Qty: 100 CAPSULE | Refills: 2 | Status: SHIPPED | OUTPATIENT
Start: 2024-04-01

## 2024-04-04 ENCOUNTER — TELEMEDICINE (OUTPATIENT)
Dept: CARDIOLOGY | Facility: CLINIC | Age: 84
End: 2024-04-04
Payer: MEDICARE

## 2024-04-04 ENCOUNTER — OFFICE VISIT (OUTPATIENT)
Dept: OPHTHALMOLOGY | Facility: CLINIC | Age: 84
End: 2024-04-04
Payer: COMMERCIAL

## 2024-04-04 DIAGNOSIS — H52.203 HYPEROPIA OF BOTH EYES WITH ASTIGMATISM AND PRESBYOPIA: ICD-10-CM

## 2024-04-04 DIAGNOSIS — H40.003 GLAUCOMA SUSPECT OF BOTH EYES: ICD-10-CM

## 2024-04-04 DIAGNOSIS — H52.4 HYPEROPIA OF BOTH EYES WITH ASTIGMATISM AND PRESBYOPIA: ICD-10-CM

## 2024-04-04 DIAGNOSIS — H50.22 HYPERTROPIA OF LEFT EYE: ICD-10-CM

## 2024-04-04 DIAGNOSIS — H52.03 HYPEROPIA OF BOTH EYES WITH ASTIGMATISM AND PRESBYOPIA: ICD-10-CM

## 2024-04-04 DIAGNOSIS — Z98.61 CAD S/P PERCUTANEOUS CORONARY ANGIOPLASTY: Primary | ICD-10-CM

## 2024-04-04 DIAGNOSIS — I25.10 CAD S/P PERCUTANEOUS CORONARY ANGIOPLASTY: Primary | ICD-10-CM

## 2024-04-04 DIAGNOSIS — H35.361 DRUSEN OF RIGHT MACULA: Primary | ICD-10-CM

## 2024-04-04 DIAGNOSIS — H43.813 PVD (POSTERIOR VITREOUS DETACHMENT), BOTH EYES: ICD-10-CM

## 2024-04-04 PROCEDURE — 1157F ADVNC CARE PLAN IN RCRD: CPT | Performed by: NURSE PRACTITIONER

## 2024-04-04 PROCEDURE — 1159F MED LIST DOCD IN RCRD: CPT | Performed by: NURSE PRACTITIONER

## 2024-04-04 PROCEDURE — 1036F TOBACCO NON-USER: CPT | Performed by: NURSE PRACTITIONER

## 2024-04-04 PROCEDURE — 1160F RVW MEDS BY RX/DR IN RCRD: CPT | Performed by: NURSE PRACTITIONER

## 2024-04-04 PROCEDURE — 92012 INTRM OPH EXAM EST PATIENT: CPT | Performed by: OPTOMETRIST

## 2024-04-04 PROCEDURE — 1123F ACP DISCUSS/DSCN MKR DOCD: CPT | Performed by: NURSE PRACTITIONER

## 2024-04-04 PROCEDURE — 92134 CPTRZ OPH DX IMG PST SGM RTA: CPT | Mod: BILATERAL PROCEDURE | Performed by: OPTOMETRIST

## 2024-04-04 PROCEDURE — 99213 OFFICE O/P EST LOW 20 MIN: CPT | Performed by: NURSE PRACTITIONER

## 2024-04-04 ASSESSMENT — SLIT LAMP EXAM - LIDS
COMMENTS: GOOD POSITION
COMMENTS: GOOD POSITION

## 2024-04-04 ASSESSMENT — TONOMETRY
OS_IOP_MMHG: 15
OD_IOP_MMHG: 15
IOP_METHOD: GOLDMANN APPLANATION

## 2024-04-04 ASSESSMENT — REFRACTION_FINALRX
OD_VPRISM: 4.0
OS_VPRISM: 4.0

## 2024-04-04 ASSESSMENT — REFRACTION_MANIFEST
OD_SPHERE: +0.75
OS_VPRISM: 4.0
OD_AXIS: 145
OS_SPHERE: PLANO
OD_ADD: +2.75
OD_VPRISM: 4.0
OD_CYLINDER: -1.00
OS_ADD: +2.75

## 2024-04-04 ASSESSMENT — REFRACTION_WEARINGRX
OS_SPHERE: PLANO
OS_ADD: +2.75
OD_VPRISM: 4.0
OS_VBASE: DOWN
OD_VBASE: UP
OD_CYLINDER: -1.00
OD_ADD: +2.75
OD_SPHERE: +1.00
OD_AXIS: 145
OS_VPRISM: 4.0

## 2024-04-04 ASSESSMENT — EXTERNAL EXAM - RIGHT EYE: OD_EXAM: NORMAL

## 2024-04-04 ASSESSMENT — CONF VISUAL FIELD
OS_INFERIOR_TEMPORAL_RESTRICTION: 0
OD_SUPERIOR_TEMPORAL_RESTRICTION: 0
OS_SUPERIOR_TEMPORAL_RESTRICTION: 0
OS_SUPERIOR_NASAL_RESTRICTION: 0
OD_NORMAL: 1
OD_SUPERIOR_NASAL_RESTRICTION: 0
OS_INFERIOR_NASAL_RESTRICTION: 0
OD_INFERIOR_NASAL_RESTRICTION: 0
OD_INFERIOR_TEMPORAL_RESTRICTION: 0
OS_NORMAL: 1

## 2024-04-04 ASSESSMENT — VISUAL ACUITY
METHOD: SNELLEN - LINEAR
OS_CC: 20/25
OD_CC: 20/30-2
OD_CC: J1
OS_CC: J1

## 2024-04-04 ASSESSMENT — EXTERNAL EXAM - LEFT EYE: OS_EXAM: NORMAL

## 2024-04-04 ASSESSMENT — CUP TO DISC RATIO
OD_RATIO: .3
OS_RATIO: .5

## 2024-04-04 NOTE — PROGRESS NOTES
Name : Sidney Zapata   : 1940   MRN : [id number]   ENC Date : 2024    CC: testing follow up     HPI:    Mr Zapata is a 84 y.o. M with PMHx sig for CAD s/p PCI & stenting of RCA , OM1 & D1 (2008), HTN, DLD, COPD, Ashton's Esophagus who presents virtually today to review echo results     He has done very well since his last visit. Reports no major health issues and has had no hospitalizations. He is compliant with his medications and reports no side effects. He has been physically active, caretaker of wife who is in wheelchair & split level. Denies any chest pain, pressure, SOB/CAMPA, PND, orthopnea, LE edema, palpitations, lightheadedness, dizziness, or syncope.     Knees are going but stays active by pushing wife in chair, shopping, going up & down stairs. Wife has a lot of health issues & is on dialysis.    ROS: unless otherwise noted in the history of present illness, all other systems were reviewed and they are negative for complaints     Allergies:  Prevnar 13 (pf) [pneumoc 13-drea conj-dip cr(pf)] and Penicillins    Current Outpatient Medications   Medication Instructions    atenolol (Tenormin) 25 mg tablet TAKE 1 TABLET BY MOUTH DAILY    atorvastatin (LIPITOR) 80 mg, oral, Daily    escitalopram (LEXAPRO) 20 mg, oral, Daily    finasteride (PROSCAR) 5 mg, oral, Daily    lisinopril 10 mg, oral, Daily    omeprazole (PRILOSEC) 20 mg, oral, Daily    tamsulosin (FLOMAX) 0.4 mg, oral, Daily    traZODone (DESYREL)  mg, oral, Nightly PRN, FOR INSOMNIA        Last Labs:  CBC  Lab Results   Component Value Date    WBC 6.1 2024    HGB 12.9 (L) 2024    HCT 40.7 (L) 2024    MCV 96 2024     2024       CMP  Lab Results   Component Value Date    CALCIUM 9.1 2024    PROT 6.6 2024    ALBUMIN 4.0 2024    AST 21 2024    ALT 16 2024    ALKPHOS 71 2024    BILITOT 0.9 2024       BMP   Lab Results   Component Value Date      "02/28/2024    K 4.0 02/28/2024     02/28/2024    CO2 29 02/28/2024    GLUCOSE 100 (H) 02/28/2024    BUN 18 02/28/2024    CREATININE 1.09 02/28/2024       LIPID PANEL   Lab Results   Component Value Date    CHOL 107 02/28/2024    TRIG 70 02/28/2024    HDL 46.3 02/28/2024    CHHDL 2.3 02/28/2024    LDLF 43 10/05/2022    VLDL 14 02/28/2024    NHDL 61 02/28/2024       RENAL FUNCTION PANEL   Lab Results   Component Value Date    GLUCOSE 100 (H) 02/28/2024     02/28/2024    K 4.0 02/28/2024     02/28/2024    CO2 29 02/28/2024    ANIONGAP 12 02/28/2024    BUN 18 02/28/2024    CREATININE 1.09 02/28/2024    GFRMALE 65 10/05/2022    CALCIUM 9.1 02/28/2024    ALBUMIN 4.0 02/28/2024        No results found for: \"BNP\", \"HGBA1C\"    Last Recorded Vitals:  There were no vitals filed for this visit. As this is virtual    Physical Exam:  A+Ox3, NAD    Last Cardiology Tests:  Echo 3/28/24: EF 55-60%, impaired relaxation, aortic sclerosis with normal leaflet mobility    EKG 2/15/23: Sinus Bradycardia, HR 54 bpm, 1st degree AVB  Echo 2008     Assessment/Plan:  1. CAD s/p PCI & stenting of RCA , OM1 & D1 (01/2008). EKG is baseline. No angina  - c/w Atorvastatin 80mg QD  - c/w Atenolol 25mg every day  - echocardiogram completed as above, results reviewed with Sidney. All questions answered. Would repeat in ~ 5 yrs     2. Hypertension. Well controlled on current regimen     3. DLD. Tolerating statin well     Follow up in 1 year or as needed     Tracy M Schwab, APRN-CNP  "

## 2024-04-04 NOTE — PROGRESS NOTES
Rx x 2 one for regular distance and near and one for computer and near and both with 8 vertical prism total OD 4 BU OS 4 BD. Prism eliminated diplopia and doing well. No changes recommended.     PCIOL and PCO OS with increased haloes and VA was 20/20 OS last year and now 20/25. Referred for YAG capsulotomy OS.     2 small drusen macula OD.  Optical coherence tomography of the macula revealed:   OD: Normal foveal contour, photoreceptor, retinal pigment epithelium, IS/OS junction, central field 260 micron.  Vitreous hyaloid base not visualized.  Findings are c/w PVD and minimal drusen stage AMD  OS:  Normal foveal contour, photoreceptor, retinal pigment epithelium, IS/OS junction, central field 260 micron.  Vitreous hyaloid base visualized.  Findings are c/w PVD and minimal drusen stage AMD    Glaucoma suspect due to CD asymmetry. OS larger than OD. IOP 15 OU.   Optical coherence tomography of the retinal nerve fiber layer (RNFL) revealed:   OD: Reduced thickness in inferior sectors with an average RNFL thickness of 67 micron.  OS: Reduced thickness in inferior and superior sectors with an average RNFL thickness of 56 micron.     RTC 6 months with Justin for IOP pachymetry VF 24-2 and OCT RNFL  Next available for Yag capsulotomy OS  1 year for full DFE and OCT macula RNFL.    
No

## 2024-07-03 DIAGNOSIS — F41.9 ANXIETY: ICD-10-CM

## 2024-07-03 RX ORDER — ESCITALOPRAM OXALATE 20 MG/1
20 TABLET ORAL DAILY
Qty: 90 TABLET | Refills: 3 | Status: SHIPPED | OUTPATIENT
Start: 2024-07-03

## 2024-07-10 DIAGNOSIS — I10 PRIMARY HYPERTENSION: ICD-10-CM

## 2024-07-10 RX ORDER — LISINOPRIL 10 MG/1
10 TABLET ORAL DAILY
Qty: 100 TABLET | Refills: 2 | Status: SHIPPED | OUTPATIENT
Start: 2024-07-10

## 2024-08-08 ENCOUNTER — APPOINTMENT (OUTPATIENT)
Dept: PRIMARY CARE | Facility: CLINIC | Age: 84
End: 2024-08-08
Payer: MEDICARE

## 2024-08-19 ENCOUNTER — APPOINTMENT (OUTPATIENT)
Dept: OPHTHALMOLOGY | Facility: CLINIC | Age: 84
End: 2024-08-19
Payer: MEDICARE

## 2024-09-09 ENCOUNTER — APPOINTMENT (OUTPATIENT)
Dept: OPHTHALMOLOGY | Facility: CLINIC | Age: 84
End: 2024-09-09
Payer: MEDICARE

## 2024-09-09 DIAGNOSIS — H26.492 PCO (POSTERIOR CAPSULAR OPACIFICATION), LEFT: Primary | ICD-10-CM

## 2024-09-09 PROCEDURE — 66821 AFTER CATARACT LASER SURGERY: CPT | Mod: LEFT SIDE | Performed by: OPHTHALMOLOGY

## 2024-09-09 ASSESSMENT — REFRACTION_WEARINGRX
OD_SPHERE: +0.75
OD_VPRISM: 4.0
OS_VBASE: DOWN
OS_ADD: +2.75
OD_VBASE: UP
OD_AXIS: 145
OS_VPRISM: 4.0
OS_SPHERE: PLANO
OD_CYLINDER: -1.00
OD_ADD: +2.75

## 2024-09-09 ASSESSMENT — VISUAL ACUITY
CORRECTION_TYPE: GLASSES
OD_CC: 20/50
OS_PH_CC: 20/25
OD_PH_CC: 20/30-2
OS_CC: 20/25
METHOD: SNELLEN - LINEAR
OS_BAT_MED: 20/40

## 2024-09-09 ASSESSMENT — TONOMETRY
OD_IOP_MMHG: 16
IOP_METHOD: GOLDMANN APPLANATION
OS_IOP_MMHG: 16

## 2024-09-09 ASSESSMENT — CUP TO DISC RATIO
OD_RATIO: .3
OS_RATIO: .5

## 2024-09-09 ASSESSMENT — SLIT LAMP EXAM - LIDS
COMMENTS: GOOD POSITION
COMMENTS: GOOD POSITION

## 2024-09-09 ASSESSMENT — EXTERNAL EXAM - RIGHT EYE: OD_EXAM: NORMAL

## 2024-09-09 ASSESSMENT — ENCOUNTER SYMPTOMS: EYES NEGATIVE: 1

## 2024-09-09 ASSESSMENT — EXTERNAL EXAM - LEFT EYE: OS_EXAM: NORMAL

## 2024-09-09 NOTE — PROGRESS NOTES
Assessment/Plan   Diagnoses and all orders for this visit:  PCO (posterior capsular opacification), left  Visually significant PCO OS  -     YAG Capsulotomy - OS - Left Eye

## 2024-09-17 ENCOUNTER — APPOINTMENT (OUTPATIENT)
Dept: PRIMARY CARE | Facility: CLINIC | Age: 84
End: 2024-09-17
Payer: MEDICARE

## 2024-09-17 VITALS
WEIGHT: 181.2 LBS | DIASTOLIC BLOOD PRESSURE: 70 MMHG | HEART RATE: 67 BPM | RESPIRATION RATE: 16 BRPM | OXYGEN SATURATION: 96 % | SYSTOLIC BLOOD PRESSURE: 132 MMHG | BODY MASS INDEX: 22.53 KG/M2 | TEMPERATURE: 98 F | HEIGHT: 75 IN

## 2024-09-17 DIAGNOSIS — Z23 NEED FOR IMMUNIZATION AGAINST INFLUENZA: ICD-10-CM

## 2024-09-17 DIAGNOSIS — Z98.61 CAD S/P PERCUTANEOUS CORONARY ANGIOPLASTY: ICD-10-CM

## 2024-09-17 DIAGNOSIS — M25.561 PAIN IN BOTH KNEES, UNSPECIFIED CHRONICITY: ICD-10-CM

## 2024-09-17 DIAGNOSIS — N40.1 BENIGN PROSTATIC HYPERPLASIA WITH URINARY FREQUENCY: ICD-10-CM

## 2024-09-17 DIAGNOSIS — Z12.83 SKIN CANCER SCREENING: ICD-10-CM

## 2024-09-17 DIAGNOSIS — K51.90 ULCERATIVE COLITIS WITHOUT COMPLICATIONS, UNSPECIFIED LOCATION (MULTI): ICD-10-CM

## 2024-09-17 DIAGNOSIS — R35.0 BENIGN PROSTATIC HYPERPLASIA WITH URINARY FREQUENCY: ICD-10-CM

## 2024-09-17 DIAGNOSIS — M25.562 PAIN IN BOTH KNEES, UNSPECIFIED CHRONICITY: ICD-10-CM

## 2024-09-17 DIAGNOSIS — F33.1 MAJOR DEPRESSIVE DISORDER, RECURRENT, MODERATE: ICD-10-CM

## 2024-09-17 DIAGNOSIS — I10 PRIMARY HYPERTENSION: Primary | ICD-10-CM

## 2024-09-17 DIAGNOSIS — I25.10 CAD S/P PERCUTANEOUS CORONARY ANGIOPLASTY: ICD-10-CM

## 2024-09-17 DIAGNOSIS — K22.719 BARRETT'S ESOPHAGUS WITH DYSPLASIA: ICD-10-CM

## 2024-09-17 DIAGNOSIS — H91.93 BILATERAL HEARING LOSS, UNSPECIFIED HEARING LOSS TYPE: ICD-10-CM

## 2024-09-17 DIAGNOSIS — E78.5 DYSLIPIDEMIA: ICD-10-CM

## 2024-09-17 PROCEDURE — 1157F ADVNC CARE PLAN IN RCRD: CPT | Performed by: INTERNAL MEDICINE

## 2024-09-17 PROCEDURE — 1123F ACP DISCUSS/DSCN MKR DOCD: CPT | Performed by: INTERNAL MEDICINE

## 2024-09-17 PROCEDURE — 3075F SYST BP GE 130 - 139MM HG: CPT | Performed by: INTERNAL MEDICINE

## 2024-09-17 PROCEDURE — 1036F TOBACCO NON-USER: CPT | Performed by: INTERNAL MEDICINE

## 2024-09-17 PROCEDURE — 90662 IIV NO PRSV INCREASED AG IM: CPT | Performed by: INTERNAL MEDICINE

## 2024-09-17 PROCEDURE — 1159F MED LIST DOCD IN RCRD: CPT | Performed by: INTERNAL MEDICINE

## 2024-09-17 PROCEDURE — G0008 ADMIN INFLUENZA VIRUS VAC: HCPCS | Performed by: INTERNAL MEDICINE

## 2024-09-17 PROCEDURE — 99214 OFFICE O/P EST MOD 30 MIN: CPT | Performed by: INTERNAL MEDICINE

## 2024-09-17 PROCEDURE — 3078F DIAST BP <80 MM HG: CPT | Performed by: INTERNAL MEDICINE

## 2024-09-17 PROCEDURE — 1160F RVW MEDS BY RX/DR IN RCRD: CPT | Performed by: INTERNAL MEDICINE

## 2024-09-17 PROCEDURE — G2211 COMPLEX E/M VISIT ADD ON: HCPCS | Performed by: INTERNAL MEDICINE

## 2024-09-17 ASSESSMENT — ENCOUNTER SYMPTOMS
FREQUENCY: 0
BLOOD IN STOOL: 0
VOICE CHANGE: 0
SHORTNESS OF BREATH: 0
DIFFICULTY URINATING: 0
UNEXPECTED WEIGHT CHANGE: 0
COLOR CHANGE: 0
COUGH: 0
ADENOPATHY: 0
EYE DISCHARGE: 0
BRUISES/BLEEDS EASILY: 0
POLYDIPSIA: 0
LIGHT-HEADEDNESS: 0
PSYCHIATRIC NEGATIVE: 1
SEIZURES: 0
TREMORS: 0
SPEECH DIFFICULTY: 0
ENDOCRINE NEGATIVE: 1
FLANK PAIN: 0
EYE REDNESS: 0
NECK STIFFNESS: 0
RESPIRATORY NEGATIVE: 1
SLEEP DISTURBANCE: 0
VOMITING: 0
AGITATION: 0
CARDIOVASCULAR NEGATIVE: 1
HEMATOLOGIC/LYMPHATIC NEGATIVE: 1
NECK PAIN: 0
DYSURIA: 0
ALLERGIC/IMMUNOLOGIC NEGATIVE: 1
PALPITATIONS: 0
EYES NEGATIVE: 1
MYALGIAS: 0
HEADACHES: 0
EYE PAIN: 0
CHEST TIGHTNESS: 0
FACIAL ASYMMETRY: 0
NERVOUS/ANXIOUS: 0
CONSTIPATION: 0
DIZZINESS: 0
POLYPHAGIA: 0
STRIDOR: 0
ARTHRALGIAS: 1
BACK PAIN: 0
DIARRHEA: 0
HALLUCINATIONS: 0
ACTIVITY CHANGE: 0
CONFUSION: 0
WEAKNESS: 0
NUMBNESS: 0
NAUSEA: 0
WOUND: 0
SINUS PAIN: 0
APPETITE CHANGE: 0
WHEEZING: 0
CONSTITUTIONAL NEGATIVE: 1
TROUBLE SWALLOWING: 0
SINUS PRESSURE: 0
SORE THROAT: 0
JOINT SWELLING: 0
GASTROINTESTINAL NEGATIVE: 1
ABDOMINAL PAIN: 0

## 2024-09-17 ASSESSMENT — PATIENT HEALTH QUESTIONNAIRE - PHQ9
SUM OF ALL RESPONSES TO PHQ9 QUESTIONS 1 AND 2: 0
2. FEELING DOWN, DEPRESSED OR HOPELESS: NOT AT ALL
1. LITTLE INTEREST OR PLEASURE IN DOING THINGS: NOT AT ALL

## 2024-09-17 NOTE — PROGRESS NOTES
Subjective   Patient ID: Sidney Zapata is a 84 y.o. male who presents for Follow-up (Pt is here due to a 6 month follow up).  HPI    This is 85 yo male with very complex medical problems including HTN, HLD, CAD, GERD, Geoff's esophagus, Ulcerative colitis, depression, COPD, back pain, OA.    We reviewed his medical conditions during today's visit.    Patient has not been feeling well for several weeks, c/o bilateral knee pain which interferes with walking and other activities.  No recent fall or injury.      He has been complaint with prescribed medications.    We reviewed and discussed details of recent blood work: CBC, CMP, TSH, Lipid panel, Hb A1c, Vit D, PSA done in Feb 2024.  Results within acceptable range.    Another concern is hearing deterioration.   Will refer to audiology.    He has been concerned about new skin lesions including enlarging one on his nose, will refer to dermatology.    Patient has been taking care of his wife who suffers from multiple medical problems and needs 24/7 care.      Escitalopram helps with depression and anxiety symptoms.      CT chest in 2016 showed stable, small pulmonary nodules and large hiatal hernia.      His colonoscopy and EGD in 2016 were acceptable. there was no evidence of Ashton's esophagus.   Symptoms of ulcerative colitis have been controlled with medication.   Patient follows with GI Dr. Goldsmith regarding Ulcerative colitis.  Follows with  cardiology NP Tracy Schwab.  Patient has been compliant with his medications, his BP has been well controlled. CAD-stable. He had coronary stent put in 12 years ago.      BPH symptoms have been controlled with Tamsulosin.         Review of Systems   Constitutional: Negative.  Negative for activity change, appetite change and unexpected weight change.   HENT:  Positive for hearing loss. Negative for congestion, ear discharge, ear pain, mouth sores, nosebleeds, sinus pressure, sinus pain, sore throat, trouble swallowing and  "voice change.    Eyes: Negative.  Negative for pain, discharge, redness and visual disturbance.   Respiratory: Negative.  Negative for cough, chest tightness, shortness of breath, wheezing and stridor.    Cardiovascular: Negative.  Negative for chest pain, palpitations and leg swelling.   Gastrointestinal: Negative.  Negative for abdominal pain, blood in stool, constipation, diarrhea, nausea and vomiting.   Endocrine: Negative.  Negative for polydipsia, polyphagia and polyuria.   Genitourinary: Negative.  Negative for difficulty urinating, dysuria, flank pain, frequency and urgency.   Musculoskeletal:  Positive for arthralgias and gait problem. Negative for back pain, joint swelling, myalgias, neck pain and neck stiffness.   Skin: Negative.  Negative for color change, rash and wound.   Allergic/Immunologic: Negative.  Negative for environmental allergies, food allergies and immunocompromised state.   Neurological:  Negative for dizziness, tremors, seizures, syncope, facial asymmetry, speech difficulty, weakness, light-headedness, numbness and headaches.   Hematological: Negative.  Negative for adenopathy. Does not bruise/bleed easily.   Psychiatric/Behavioral: Negative.  Negative for agitation, behavioral problems, confusion, hallucinations, sleep disturbance and suicidal ideas. The patient is not nervous/anxious.    All other systems reviewed and are negative.      Objective     Review of systems was performed and all systems were negative except what in HPI    /70 (BP Location: Right arm, Patient Position: Sitting, BP Cuff Size: Adult)   Pulse 67   Temp 36.7 °C (98 °F) (Temporal)   Resp 16   Ht 1.905 m (6' 3\")   Wt 82.2 kg (181 lb 3.2 oz)   SpO2 96%   BMI 22.65 kg/m²    Physical Exam  Vitals and nursing note reviewed.   Constitutional:       General: He is not in acute distress.     Appearance: Normal appearance.   HENT:      Head: Normocephalic and atraumatic.      Right Ear: Tympanic membrane, ear " canal and external ear normal.      Left Ear: Tympanic membrane, ear canal and external ear normal.      Nose: Nose normal. No congestion or rhinorrhea.   Eyes:      General:         Right eye: No discharge.         Left eye: No discharge.      Extraocular Movements: Extraocular movements intact.      Conjunctiva/sclera: Conjunctivae normal.      Pupils: Pupils are equal, round, and reactive to light.   Cardiovascular:      Rate and Rhythm: Normal rate and regular rhythm.      Pulses: Normal pulses.      Heart sounds: Normal heart sounds. No murmur heard.     No friction rub. No gallop.   Pulmonary:      Effort: Pulmonary effort is normal. No respiratory distress.      Breath sounds: Normal breath sounds. No stridor. No wheezing, rhonchi or rales.   Chest:      Chest wall: No tenderness.   Abdominal:      General: Bowel sounds are normal.      Palpations: Abdomen is soft. There is no mass.      Tenderness: There is no abdominal tenderness. There is no guarding or rebound.   Musculoskeletal:         General: No swelling or deformity. Normal range of motion.      Cervical back: Normal range of motion and neck supple. No rigidity or tenderness.      Right lower leg: No edema.      Left lower leg: No edema.   Lymphadenopathy:      Cervical: No cervical adenopathy.   Skin:     General: Skin is warm and dry.      Coloration: Skin is not jaundiced.      Findings: No bruising or erythema.   Neurological:      General: No focal deficit present.      Mental Status: He is alert and oriented to person, place, and time. Mental status is at baseline.      Cranial Nerves: No cranial nerve deficit.      Motor: No weakness.      Coordination: Coordination normal.      Gait: Gait normal.   Psychiatric:         Mood and Affect: Mood normal.         Behavior: Behavior normal.         Thought Content: Thought content normal.         Judgment: Judgment normal.         Assessment/Plan   Problem List Items Addressed This Visit              ICD-10-CM       Cardiac and Vasculature    CAD S/P percutaneous coronary angioplasty I25.10, Z98.61     Clinically stable. F/up with cardiology.         Dyslipidemia E78.5     Continue statin.         Hypertension - Primary I10     Controlled . Continue current treatment.            ENT    Bilateral hearing loss H91.93    Relevant Orders    Referral to Audiology       Gastrointestinal and Abdominal    Ashton's esophagus K22.70     Clinically stable. F/up with GI.         Ulcerative colitis (Multi) K51.90     Clinically stable. F/up with GI.            Genitourinary and Reproductive    Benign enlargement of prostate N40.0     Clinically stable. F/up with urology.             Mental Health    Major depressive disorder, recurrent, moderate (Multi) F33.1     Clinically stable. Will monitor.             Skin    Skin cancer screening Z12.83    Relevant Orders    Referral to Dermatology     Other Visit Diagnoses         Codes    Need for immunization against influenza     Z23    Relevant Orders    Flu vaccine, trivalent, preservative free, HIGH-DOSE, age 65y+ (Fluzone) (Completed)    Pain in both knees, unspecified chronicity     M25.561, M25.562    Relevant Orders    XR knee 4+ views bilateral    Referral to Orthopaedic Surgery        It was a pleasure to see you today.  I would like to remind you about importance of a healthy lifestyle in order to improve your well-being and live longer.  Try to engage in physical activities for at least 150 minutes per week.  Eat about 10 servings of fruits and vegetables daily. My advice is 2 servings of fruits and 8 servings of vegetables.  For vegetables choose at least half of them green and at least half of them fresh.  Please avoid sugar, salt, fried food and saturated fat.    F/up in Feb 2025 or sooner if needed.

## 2024-09-19 ENCOUNTER — HOSPITAL ENCOUNTER (OUTPATIENT)
Dept: RADIOLOGY | Facility: CLINIC | Age: 84
Discharge: HOME | End: 2024-09-19
Payer: MEDICARE

## 2024-09-19 DIAGNOSIS — M25.562 PAIN IN BOTH KNEES, UNSPECIFIED CHRONICITY: ICD-10-CM

## 2024-09-19 DIAGNOSIS — M25.561 PAIN IN BOTH KNEES, UNSPECIFIED CHRONICITY: ICD-10-CM

## 2024-09-19 PROCEDURE — 73562 X-RAY EXAM OF KNEE 3: CPT | Mod: 50

## 2024-09-21 NOTE — RESULT ENCOUNTER NOTE
Your recent X-rays showed degenerative changes in both knee joints.  Please consult orthopedic surgeon.  Please keep your next appointment as scheduled. Dr. Sparrow

## 2024-10-01 ENCOUNTER — OFFICE VISIT (OUTPATIENT)
Dept: ORTHOPEDIC SURGERY | Facility: CLINIC | Age: 84
End: 2024-10-01
Payer: MEDICARE

## 2024-10-01 DIAGNOSIS — M25.569 KNEE PAIN, UNSPECIFIED CHRONICITY, UNSPECIFIED LATERALITY: Primary | ICD-10-CM

## 2024-10-01 PROCEDURE — 1123F ACP DISCUSS/DSCN MKR DOCD: CPT | Performed by: ORTHOPAEDIC SURGERY

## 2024-10-01 PROCEDURE — 1036F TOBACCO NON-USER: CPT | Performed by: ORTHOPAEDIC SURGERY

## 2024-10-01 PROCEDURE — 20610 DRAIN/INJ JOINT/BURSA W/O US: CPT | Mod: 50 | Performed by: ORTHOPAEDIC SURGERY

## 2024-10-01 PROCEDURE — 99213 OFFICE O/P EST LOW 20 MIN: CPT | Performed by: ORTHOPAEDIC SURGERY

## 2024-10-01 PROCEDURE — 2500000004 HC RX 250 GENERAL PHARMACY W/ HCPCS (ALT 636 FOR OP/ED): Performed by: ORTHOPAEDIC SURGERY

## 2024-10-01 PROCEDURE — 1159F MED LIST DOCD IN RCRD: CPT | Performed by: ORTHOPAEDIC SURGERY

## 2024-10-01 PROCEDURE — 99204 OFFICE O/P NEW MOD 45 MIN: CPT | Performed by: ORTHOPAEDIC SURGERY

## 2024-10-01 PROCEDURE — 1157F ADVNC CARE PLAN IN RCRD: CPT | Performed by: ORTHOPAEDIC SURGERY

## 2024-10-01 RX ORDER — TRIAMCINOLONE ACETONIDE 40 MG/ML
40 INJECTION, SUSPENSION INTRA-ARTICULAR; INTRAMUSCULAR
Status: COMPLETED | OUTPATIENT
Start: 2024-10-01 | End: 2024-10-01

## 2024-10-01 RX ORDER — LIDOCAINE HYDROCHLORIDE 10 MG/ML
2 INJECTION, SOLUTION INFILTRATION; PERINEURAL
Status: COMPLETED | OUTPATIENT
Start: 2024-10-01 | End: 2024-10-01

## 2024-10-01 NOTE — PROGRESS NOTES
History of Present Illness:  Patient presents with bilateral knee pain.  The patient localizes the pain diffusely.  Recently there has been concern for falls and instability.  There is increasing difficulty with activities of daily living and significant disability related to the knee pain.  The patient endorses the following failed non-operative treatments: Rest, ice Tylenol arthritis.   There is increasing frustration with persistent pain and swelling and decreasing distance of ambulation.  Pain is moderate, achy, diffuse.  Better with rest, worse with activity.     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    Review of Systems   GENERAL: Negative for malaise, significant weight loss, fever  MUSCULOSKELETAL: see HPI  NEURO:  Negative    Past Medical History:   Diagnosis Date    Arthropathic psoriasis, unspecified (Multi)     Psoriatic arthropathy    Atherosclerotic heart disease of native coronary artery without angina pectoris 10/04/2022    Arteriosclerosis of coronary artery    Atherosclerotic heart disease of native coronary artery without angina pectoris 08/06/2020    CAD S/P percutaneous coronary angioplasty    Drusen (degenerative) of macula, right eye 08/10/2021    Drusen of right macula    Essential (primary) hypertension 10/04/2022    Hypertension    Other hyperlipidemia 02/11/2021    Familial combined hyperlipidemia    Pain in left knee 01/07/2020    Left knee pain    Personal history of other diseases of the circulatory system     History of hypertension    Preglaucoma, unspecified, bilateral 07/18/2019    Glaucoma suspect of both eyes    Presence of intraocular lens 07/18/2019    Pseudophakia of both eyes    Secondary noninfectious iridocyclitis, 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    Secondary iritis of right eye    Unspecified cataract 01/07/2015    Cataract     Past Surgical History:   Procedure Laterality Date    CATARACT EXTRACTION  01/14/2015    Cataract  Extraction    COLONOSCOPY  11/28/2012    Complete Colonoscopy    OTHER SURGICAL HISTORY  11/28/2012    Desc Colon Proc Patient In Lithot Pos, Digit Rect Exam Done    OTHER SURGICAL HISTORY  11/28/2012    Cath Stent 1 Mid-Left Anterior Descending Artery    OTHER SURGICAL HISTORY  11/28/2012    Cath Stent 1 Distal Right Coronary Artery    OTHER SURGICAL HISTORY  10/24/2013    Hernia Repair Inguinal Unilateral    TONSILLECTOMY  10/24/2013    Tonsillectomy       Current Outpatient Medications:     atenolol (Tenormin) 25 mg tablet, TAKE 1 TABLET BY MOUTH DAILY, Disp: 100 tablet, Rfl: 2    atorvastatin (Lipitor) 80 mg tablet, TAKE 1 TABLET BY MOUTH ONCE  DAILY, Disp: 100 tablet, Rfl: 2    escitalopram (Lexapro) 20 mg tablet, TAKE 1 TABLET BY MOUTH ONCE  DAILY, Disp: 90 tablet, Rfl: 3    finasteride (Proscar) 5 mg tablet, TAKE 1 TABLET BY MOUTH ONCE  DAILY, Disp: 100 tablet, Rfl: 2    lisinopril 10 mg tablet, TAKE 1 TABLET BY MOUTH DAILY, Disp: 100 tablet, Rfl: 2    omeprazole (PriLOSEC) 20 mg DR capsule, TAKE 1 CAPSULE BY MOUTH ONCE  DAILY, Disp: 100 capsule, Rfl: 2    tamsulosin (Flomax) 0.4 mg 24 hr capsule, TAKE 1 CAPSULE BY MOUTH ONCE  DAILY, Disp: 100 capsule, Rfl: 2    traZODone (Desyrel) 50 mg tablet, TAKE 1 TO 2 TABLETS BY  MOUTH AT BEDTIME AS NEEDED  FOR INSOMNIA, Disp: 180 tablet, Rfl: 3      Physical Examination:  Bilateral Knee:  Skin healthy and intact  No gross swelling or ecchymosis  Alignment: neutral      Effusion: mild       ROM: Near full  Crepitance with range of motion  No pain with internal rotation of the hip  Tenderness to palpation over medial and lateral joint line and with patellar compression     No laxity to valgus stress  No laxity to varus stress  Negative Lachman´s test  Negative posterior drawer test  Mild pain with Tao´s test  Neurovascular exam normal distally  2+ DP pulse and good cap refill    Radiographs:  Mild to moderate degenerative joint disease with loss of joint space,  subchondral sclerosis and cystic changes, and osteophyte formation    Assessment:  Patient with bilateral knee osteoarthritis    Plan:  We discussed the diagnosis of degenerative joint disease of the knee.  We reviewed an evidence-based approach to osteoarthritis of the knee.  We strongly encouraged low-impact aerobic activity and non-opioid analgesics.     We discussed temporary pain relief with corticosteroid injections and the associated risks.  We also discussed the conflicting evidence regarding viscosupplementation and potential long-term risks with NSAID´s.  We reviewed the role of bracing for instability and physical therapy for atrophy and gait abnormalities.  The patient elected for corticosteroid injections today, does have some tenderness over his medial joint line on the left could consider possible MRI.      L Inj/Asp: bilateral knee on 10/1/2024 3:12 PM  Indications: pain  Details: 22 G needle, anteromedial approach  Medications (Right): 2 mL lidocaine 10 mg/mL (1 %); 40 mg triamcinolone acetonide 40 mg/mL  Medications (Left): 2 mL lidocaine 10 mg/mL (1 %); 40 mg triamcinolone acetonide 40 mg/mL  Outcome: tolerated well, no immediate complications  Procedure, treatment alternatives, risks and benefits explained, specific risks discussed. Consent was given by the patient. Immediately prior to procedure a time out was called to verify the correct patient, procedure, equipment, support staff and site/side marked as required. Patient was prepped and draped in the usual sterile fashion.

## 2024-10-08 ENCOUNTER — APPOINTMENT (OUTPATIENT)
Dept: OPHTHALMOLOGY | Facility: CLINIC | Age: 84
End: 2024-10-08
Payer: COMMERCIAL

## 2024-10-13 DIAGNOSIS — F41.9 ANXIETY: ICD-10-CM

## 2024-10-14 RX ORDER — TRAZODONE HYDROCHLORIDE 50 MG/1
50-100 TABLET ORAL NIGHTLY PRN
Qty: 180 TABLET | Refills: 3 | Status: SHIPPED | OUTPATIENT
Start: 2024-10-14

## 2024-11-08 ENCOUNTER — CLINICAL SUPPORT (OUTPATIENT)
Dept: AUDIOLOGY | Facility: CLINIC | Age: 84
End: 2024-11-08
Payer: MEDICARE

## 2024-11-08 DIAGNOSIS — H91.93 BILATERAL HEARING LOSS, UNSPECIFIED HEARING LOSS TYPE: ICD-10-CM

## 2024-11-08 DIAGNOSIS — H90.3 ASYMMETRICAL SENSORINEURAL HEARING LOSS: Primary | ICD-10-CM

## 2024-11-08 DIAGNOSIS — H93.13 TINNITUS OF BOTH EARS: ICD-10-CM

## 2024-11-08 DIAGNOSIS — H90.3 SENSORINEURAL HEARING LOSS (SNHL) OF BOTH EARS: ICD-10-CM

## 2024-11-08 PROCEDURE — 92550 TYMPANOMETRY & REFLEX THRESH: CPT | Performed by: AUDIOLOGIST

## 2024-11-08 PROCEDURE — 92557 COMPREHENSIVE HEARING TEST: CPT | Performed by: AUDIOLOGIST

## 2024-11-08 ASSESSMENT — ENCOUNTER SYMPTOMS: OCCASIONAL FEELINGS OF UNSTEADINESS: 0

## 2024-11-08 ASSESSMENT — PAIN - FUNCTIONAL ASSESSMENT: PAIN_FUNCTIONAL_ASSESSMENT: 0-10

## 2024-11-08 ASSESSMENT — PAIN SCALES - GENERAL: PAINLEVEL_OUTOF10: 0 - NO PAIN
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
## 2024-11-12 ENCOUNTER — OFFICE VISIT (OUTPATIENT)
Dept: PRIMARY CARE | Facility: CLINIC | Age: 84
End: 2024-11-12
Payer: MEDICARE

## 2024-11-12 ENCOUNTER — HOSPITAL ENCOUNTER (OUTPATIENT)
Dept: RADIOLOGY | Facility: CLINIC | Age: 84
Discharge: HOME | End: 2024-11-12
Payer: MEDICARE

## 2024-11-12 VITALS
TEMPERATURE: 98 F | SYSTOLIC BLOOD PRESSURE: 122 MMHG | RESPIRATION RATE: 16 BRPM | OXYGEN SATURATION: 97 % | HEIGHT: 75 IN | WEIGHT: 179.2 LBS | DIASTOLIC BLOOD PRESSURE: 60 MMHG | HEART RATE: 68 BPM | BODY MASS INDEX: 22.28 KG/M2

## 2024-11-12 DIAGNOSIS — M54.41 BILATERAL LOW BACK PAIN WITH BILATERAL SCIATICA, UNSPECIFIED CHRONICITY: ICD-10-CM

## 2024-11-12 DIAGNOSIS — M54.42 BILATERAL LOW BACK PAIN WITH BILATERAL SCIATICA, UNSPECIFIED CHRONICITY: Primary | ICD-10-CM

## 2024-11-12 DIAGNOSIS — F33.1 MAJOR DEPRESSIVE DISORDER, RECURRENT, MODERATE: ICD-10-CM

## 2024-11-12 DIAGNOSIS — K51.90 ULCERATIVE COLITIS WITHOUT COMPLICATIONS, UNSPECIFIED LOCATION (MULTI): ICD-10-CM

## 2024-11-12 DIAGNOSIS — M54.42 BILATERAL LOW BACK PAIN WITH BILATERAL SCIATICA, UNSPECIFIED CHRONICITY: ICD-10-CM

## 2024-11-12 DIAGNOSIS — Z98.61 CAD S/P PERCUTANEOUS CORONARY ANGIOPLASTY: ICD-10-CM

## 2024-11-12 DIAGNOSIS — M54.41 BILATERAL LOW BACK PAIN WITH BILATERAL SCIATICA, UNSPECIFIED CHRONICITY: Primary | ICD-10-CM

## 2024-11-12 DIAGNOSIS — I25.10 CAD S/P PERCUTANEOUS CORONARY ANGIOPLASTY: ICD-10-CM

## 2024-11-12 DIAGNOSIS — I10 PRIMARY HYPERTENSION: ICD-10-CM

## 2024-11-12 DIAGNOSIS — L40.50 PSORIASIS WITH ARTHROPATHY (MULTI): ICD-10-CM

## 2024-11-12 PROCEDURE — 1157F ADVNC CARE PLAN IN RCRD: CPT | Performed by: INTERNAL MEDICINE

## 2024-11-12 PROCEDURE — 3078F DIAST BP <80 MM HG: CPT | Performed by: INTERNAL MEDICINE

## 2024-11-12 PROCEDURE — 72100 X-RAY EXAM L-S SPINE 2/3 VWS: CPT | Performed by: STUDENT IN AN ORGANIZED HEALTH CARE EDUCATION/TRAINING PROGRAM

## 2024-11-12 PROCEDURE — 1036F TOBACCO NON-USER: CPT | Performed by: INTERNAL MEDICINE

## 2024-11-12 PROCEDURE — 3074F SYST BP LT 130 MM HG: CPT | Performed by: INTERNAL MEDICINE

## 2024-11-12 PROCEDURE — 99214 OFFICE O/P EST MOD 30 MIN: CPT | Performed by: INTERNAL MEDICINE

## 2024-11-12 PROCEDURE — G2211 COMPLEX E/M VISIT ADD ON: HCPCS | Performed by: INTERNAL MEDICINE

## 2024-11-12 PROCEDURE — 1123F ACP DISCUSS/DSCN MKR DOCD: CPT | Performed by: INTERNAL MEDICINE

## 2024-11-12 PROCEDURE — 1159F MED LIST DOCD IN RCRD: CPT | Performed by: INTERNAL MEDICINE

## 2024-11-12 PROCEDURE — 1160F RVW MEDS BY RX/DR IN RCRD: CPT | Performed by: INTERNAL MEDICINE

## 2024-11-12 PROCEDURE — 72100 X-RAY EXAM L-S SPINE 2/3 VWS: CPT

## 2024-11-12 RX ORDER — PREDNISONE 5 MG/1
TABLET ORAL
Qty: 15 TABLET | Refills: 0 | Status: SHIPPED | OUTPATIENT
Start: 2024-11-12 | End: 2024-11-22

## 2024-11-12 ASSESSMENT — ENCOUNTER SYMPTOMS
NECK PAIN: 0
STRIDOR: 0
NEUROLOGICAL NEGATIVE: 1
BRUISES/BLEEDS EASILY: 0
JOINT SWELLING: 0
SINUS PRESSURE: 0
HALLUCINATIONS: 0
EYE REDNESS: 0
COLOR CHANGE: 0
WHEEZING: 0
RESPIRATORY NEGATIVE: 1
SORE THROAT: 0
EYES NEGATIVE: 1
SEIZURES: 0
HEMATOLOGIC/LYMPHATIC NEGATIVE: 1
DIZZINESS: 0
NAUSEA: 0
ABDOMINAL PAIN: 0
SHORTNESS OF BREATH: 0
EYE DISCHARGE: 0
POLYDIPSIA: 0
SPEECH DIFFICULTY: 0
CHEST TIGHTNESS: 0
TROUBLE SWALLOWING: 0
FACIAL ASYMMETRY: 0
PSYCHIATRIC NEGATIVE: 1
CONFUSION: 0
CONSTIPATION: 0
EYE PAIN: 0
SLEEP DISTURBANCE: 0
BACK PAIN: 1
APPETITE CHANGE: 0
AGITATION: 0
MYALGIAS: 0
DIARRHEA: 0
FLANK PAIN: 0
WOUND: 0
TREMORS: 0
PALPITATIONS: 0
POLYPHAGIA: 0
ARTHRALGIAS: 0
GASTROINTESTINAL NEGATIVE: 1
ENDOCRINE NEGATIVE: 1
CARDIOVASCULAR NEGATIVE: 1
UNEXPECTED WEIGHT CHANGE: 0
FREQUENCY: 0
VOMITING: 0
NECK STIFFNESS: 0
HEADACHES: 0
ALLERGIC/IMMUNOLOGIC NEGATIVE: 1
LIGHT-HEADEDNESS: 0
DIFFICULTY URINATING: 0
BLOOD IN STOOL: 0
CONSTITUTIONAL NEGATIVE: 1
WEAKNESS: 0
SINUS PAIN: 0
NUMBNESS: 0
COUGH: 0
NERVOUS/ANXIOUS: 0
VOICE CHANGE: 0
ACTIVITY CHANGE: 0
DYSURIA: 0
ADENOPATHY: 0

## 2024-11-12 NOTE — PROGRESS NOTES
hgSubjective   Patient ID: Sidney Zapata is a 84 y.o. male who presents for Back Pain (Pt is here due to back pain for about a week ago).  Back Pain  Pertinent negatives include no abdominal pain, chest pain, dysuria, headaches, numbness or weakness.       This is 85 yo male with very complex medical problems including HTN, HLD, CAD, GERD, Geoff's esophagus, Ulcerative colitis, depression, COPD, back pain, OA.     We reviewed his medical conditions during today's visit.    Patient has been c/o low back pain for about a week.    It started after he fell asleep na cough in sitting position.  Denies any recent or remote fall, denies  pushing, pulling, lifting heavy objects.   Pain is  radiating to both thighs.  C/o numbness in both thighs, and some  tingling sensation, No focal weakness.  Denies any new urinary symptoms, no change in bowel habits. Denies fever, chills.    Patient suffers from knee OA, has been receiving steroid injections in both knees.    Patient has been taking care of his wife who suffers from multiple medical problems and needs 24/7 care.      Escitalopram helps with depression and anxiety symptoms.      CT chest in 2016 showed stable, small pulmonary nodules and large hiatal hernia.      His colonoscopy and EGD in 2016 were acceptable. there was no evidence of Ashton's esophagus.   Symptoms of ulcerative colitis have been controlled with medication.   Patient follows with GI Dr. Goldsmith regarding Ulcerative colitis.  Follows with  cardiology NP Tracy Schwab.  Patient has been compliant with his medications, his BP has been well controlled. CAD-stable. He had coronary stent put in 12 years ago.      BPH symptoms have been controlled with Tamsulosin.     Review of Systems   Constitutional: Negative.  Negative for activity change, appetite change and unexpected weight change.   HENT: Negative.  Negative for congestion, ear discharge, ear pain, hearing loss, mouth sores, nosebleeds, sinus pressure,  "sinus pain, sore throat, trouble swallowing and voice change.    Eyes: Negative.  Negative for pain, discharge, redness and visual disturbance.   Respiratory: Negative.  Negative for cough, chest tightness, shortness of breath, wheezing and stridor.    Cardiovascular: Negative.  Negative for chest pain, palpitations and leg swelling.   Gastrointestinal: Negative.  Negative for abdominal pain, blood in stool, constipation, diarrhea, nausea and vomiting.   Endocrine: Negative.  Negative for polydipsia, polyphagia and polyuria.   Genitourinary: Negative.  Negative for difficulty urinating, dysuria, flank pain, frequency and urgency.   Musculoskeletal:  Positive for back pain. Negative for arthralgias, gait problem, joint swelling, myalgias, neck pain and neck stiffness.   Skin: Negative.  Negative for color change, rash and wound.   Allergic/Immunologic: Negative.  Negative for environmental allergies, food allergies and immunocompromised state.   Neurological: Negative.  Negative for dizziness, tremors, seizures, syncope, facial asymmetry, speech difficulty, weakness, light-headedness, numbness and headaches.   Hematological: Negative.  Negative for adenopathy. Does not bruise/bleed easily.   Psychiatric/Behavioral: Negative.  Negative for agitation, behavioral problems, confusion, hallucinations, sleep disturbance and suicidal ideas. The patient is not nervous/anxious.    All other systems reviewed and are negative.      Objective     Review of systems was performed and all systems were negative except what in HPI    /60 (BP Location: Left arm, Patient Position: Sitting, BP Cuff Size: Small adult)   Pulse 68   Temp 36.7 °C (98 °F) (Temporal)   Resp 16   Ht 1.905 m (6' 3\")   Wt 81.3 kg (179 lb 3.2 oz)   SpO2 97%   BMI 22.40 kg/m²    Physical Exam  Vitals and nursing note reviewed.   Constitutional:       General: He is not in acute distress.     Appearance: Normal appearance.   HENT:      Head: " Normocephalic and atraumatic.      Right Ear: External ear normal.      Left Ear: External ear normal.      Nose: Nose normal. No congestion or rhinorrhea.   Eyes:      General:         Right eye: No discharge.         Left eye: No discharge.      Extraocular Movements: Extraocular movements intact.      Conjunctiva/sclera: Conjunctivae normal.      Pupils: Pupils are equal, round, and reactive to light.   Cardiovascular:      Rate and Rhythm: Normal rate and regular rhythm.      Pulses: Normal pulses.      Heart sounds: Normal heart sounds. No murmur heard.     No friction rub. No gallop.   Pulmonary:      Effort: Pulmonary effort is normal. No respiratory distress.      Breath sounds: Normal breath sounds. No stridor. No wheezing, rhonchi or rales.   Chest:      Chest wall: No tenderness.   Abdominal:      General: Bowel sounds are normal.      Palpations: Abdomen is soft. There is no mass.      Tenderness: There is no abdominal tenderness. There is no guarding or rebound.   Musculoskeletal:         General: No swelling or deformity. Normal range of motion.      Cervical back: Normal range of motion and neck supple. No rigidity or tenderness.      Right lower leg: No edema.      Left lower leg: No edema.      Comments: Walks with cane  There is mild tenderness on palpation along lumbar spine on both sides, no tenderness on palpation over lumbar spine   Lymphadenopathy:      Cervical: No cervical adenopathy.   Skin:     General: Skin is warm and dry.      Coloration: Skin is not jaundiced.      Findings: No bruising or erythema.   Neurological:      General: No focal deficit present.      Mental Status: He is alert and oriented to person, place, and time. Mental status is at baseline.      Cranial Nerves: No cranial nerve deficit.      Motor: No weakness.      Coordination: Coordination normal.      Gait: Gait normal.   Psychiatric:         Mood and Affect: Mood normal.         Behavior: Behavior normal.          Thought Content: Thought content normal.         Judgment: Judgment normal.         Assessment/Plan   Problem List Items Addressed This Visit             ICD-10-CM       Cardiac and Vasculature    CAD S/P percutaneous coronary angioplasty I25.10, Z98.61    Hypertension I10     Controlled . Continue current treatment.            Gastrointestinal and Abdominal    Ulcerative colitis K51.90     Clinically stable. F/up with GI.            Mental Health    Major depressive disorder, recurrent, moderate F33.1     Clinically stable. Will monitor.             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 Will monitor.         It was a pleasure to see you today.  I would like to remind you about importance of a healthy lifestyle in order to improve your well-being and live longer.  Try to engage in physical activities for at least 150 minutes per week.  Eat about 10 servings of fruits and vegetables daily. My advice is 2 servings of fruits and 8 servings of vegetables.  For vegetables choose at least half of them green and at least half of them fresh.  Please avoid sugar, salt, fried food and saturated fat.      F/up in 2-3 months or sooner if needed.

## 2024-11-18 NOTE — RESULT ENCOUNTER NOTE
Your recent X-ray of lumbar spine showed multilevel degenerative changes.  No significant change since 2019.   No fracture or dislocation.   Dr. Sparrow

## 2024-11-27 ENCOUNTER — EVALUATION (OUTPATIENT)
Dept: PHYSICAL THERAPY | Facility: CLINIC | Age: 84
End: 2024-11-27
Payer: MEDICARE

## 2024-11-27 DIAGNOSIS — M62.81 GENERALIZED MUSCLE WEAKNESS: ICD-10-CM

## 2024-11-27 DIAGNOSIS — M54.42 BILATERAL LOW BACK PAIN WITH BILATERAL SCIATICA, UNSPECIFIED CHRONICITY: Primary | ICD-10-CM

## 2024-11-27 DIAGNOSIS — M54.41 BILATERAL LOW BACK PAIN WITH BILATERAL SCIATICA, UNSPECIFIED CHRONICITY: Primary | ICD-10-CM

## 2024-11-27 DIAGNOSIS — R26.81 UNSTEADINESS ON FEET: ICD-10-CM

## 2024-11-27 PROCEDURE — 97161 PT EVAL LOW COMPLEX 20 MIN: CPT | Mod: GP

## 2024-11-27 ASSESSMENT — ENCOUNTER SYMPTOMS
LOSS OF SENSATION IN FEET: 0
OCCASIONAL FEELINGS OF UNSTEADINESS: 1
DEPRESSION: 0

## 2024-11-27 NOTE — PROGRESS NOTES
Physical Therapy Evaluation and Treatment      Patient Name: Sidney Zapata  MRN: [id number]  Today's Date: 11/27/2024  Visit #1  Time Calculation  Start Time: 1500  Stop Time: 1557  Time Calculation (min): 57 min    Insurance:  Info: 20 COPAY, 0 DED, 3500 OOP MAX,  90 DAY 2/26/2025, AUTH REQ AFTER EVAL, SPECIAL FORM   Visit Limit: 90 day   Date Range: 2/26/25  Co-Pay: $20    Assessment:  Patient is an 84 year old M presenting to OP PT for LBP. Examination findings are consistent with an episode of acute LBP, w/questionable discogenic radiculopathy, however, this therapist cannot make that dx. Pt presents w/multiple impairments including but not limited to; LBP, decreased lumbar spine ROM, decreased trunk strength, decreased LE ROM, and decreased strength bilaterally fadi of hip musculature. Pt has inconsistent radicular sx reproduction during repeated mvmnt assessment, however, observable functional mobility and pt's LBP sx severity questionably improved during and after lumbar spine extension in standing. Initiated extension bias program w/emphasis on importance of proper sx monitoring related to peripheralization vs centralization of sx. These impairments limit pt's ability to perform household and caregiver responsibilities as well as perform safe functional mobility as he demo's unsteady transfers and gait d/t decreased strength/pain. Patient will benefit from physical therapy services to improve listed impairments and decrease risk of falling.     Patient with the following impairments: decreased muscle performance, decreased ROM, decreased activity tolerance, pain, participation restrictions, impaired balance/gait, and difficulty with ADL completion    Patient's response to session: Improved gait and Increased knowledge and understanding    Plan:   -Monitor radicular sx w/ext bias program. Cont to progress PT POC as tolerated to improve mobility and pain free functional mobility.   -Further assess fall risk  "w/standardized testing when tolerated by familiar sx.     Current Problem:   1. Bilateral low back pain with bilateral sciatica, unspecified chronicity  Referral to Physical Therapy      2. Generalized muscle weakness        3. Unsteadiness on feet            Subjective   Patient is an 84 year old M presenting to OP PT for R > L sided LBP.  Pt reports his back has been sore lately after a couple of weeks ago it got real bad after he fell asleep in his recliner. Pt reports he fell asleep slumped down and when he woke up his back hurt severely and he had difficulty getting up from his chair having to awkwardly push himself up. Pt reports no other LBP prior to this episode. Pt reports he has some difficulty getting his wife's chair out of the car now as he is her primary caregiver. Pt reports the prednisone helped but not entirely. Pt reports he does have some numbness down bilateral LE R>LLE at times, feels like \"I am wearing pants even if I'm in my shorts\". Patient denies changes in bowel/bladder, saddle paresthesias, and falls.     Pain:  Pt describes pain as tightness, dull and sharp pain.    Pain is worse with getting up and out of a chair, and prolonged standing that involves fwd bending such as cooking or doing dishes.   Pain is better with UE support when standing, medications he has been using, and lying on the L side.     Pain Rating: (Numeric Pain Scale: #/10)  Current: 3  Best: 0  Worst: 6    Patient Goal: \"To get rid of the pain or reduce it so I can do all my house jobs.\"         Objective   Lumbar ROM  Flexion: Mod loss, sx relief  ^Aberrant mvmnts: Painful arc  Extension: Mod loss,   Side bend (L/R): Min loss/Min loss  Rotation (L/R): Mod loss, Max loss pain during R>L      Observation:  -Pt has significant thoracolumbar junction kyphosis.   -Abdominal Strength: Pt demos heavy reliance on UE assist to sit up from supine position and has sx provocation while attempting.   -Pt ambulates w/SPC demoing " min-mod unsteady gait d/t guarding when LBP provoked.     Repeated Motions  Baseline: Min pain sitting prior to repeated motions.    Flexion: seated x10, - Pt reports relief in flexion position w/pain while sitting up. Pt reports worse groin and thigh while performing.   Baseline: Familiar sx in upper thigh.  Extension: Prone prop 2min - sx relief while performing.   Extension: Standing lumbar/hip extension UE support on counter top. - pt reports questionable improvement in anterior LE sx after performing, but no sx while performing.   Side bend (L/R): NT    Hip ROM (L/R)  Flexion: 100°/~100°  Abduction: 45°/45°  Extension: 10°/10°  External Rotation: 45°/45°  Internal rotation: 5°/5°    Specific Lower Extremity MMT (L/R)  Iliopsoas: 4-/5, 4-/5  Gluteus Charles (prone): 3-/5, 3-/5  Hip External Rotation: 4-/5, 4-/5  Gluteus Medius: 4-/5, 4-/5  Knee Extension: 4-/5, 4-/5  Knee Flexion: 4+/5, 4+/5  Ankle DF: 5/5, 4+/5  Ankle PF: 5/5, 5/5    DTR (L/R)  Patellar L4: 2/1  Achilles S1: 1/1    Dermatomes intact BLE, ankle subjectively slightly diminished R>L.     Special Tests (L/R):  Straight Leg Raise: Negative, Negative  Wells Crossed SLR: Negative, Negative  Slump Test: P, P, questionably increasing back tightness, ankle df has no other significant change.   Femoral Dural Tension: P, P - questionable, pt has significant decreased ROM d/t rectus femoris tightness. Sx provocation in lumbar spine reproduced but w/out radicular sx in anterior thigh.   Ely's Test: Positive, Positive to 90 deg bilaterally.     Joint mobility testing (normal unless otherwise noted below)  Palpation: R lumbar paraspinals highly sensitive to palpation.       Outcome Measures:    GIULIANA = 20/50 = 40% disability.     Steadi Fall Risk  One or more falls in the last year? No  How many Times?    Was the patient injured in the fall?    Has trouble stepping onto curb? No  Comment:B/c of use of SPC  Advised to use a cane or walker to get around safely?  Yes  Often has to rush to toilet? No  Feels unsteady when walking? Yes  Has lost some feeling in feet? No  Often feels sad or depressed? No  Steadies self on furniture while walking at home? Yes  Takes medicine that makes them feel lightheaded or more tired than usual? No  Worried about Falling? No  Takes medicine to sleep or improve mood? Yes  Needs to push with hands when rising from a chair? Yes            Treatments:  -Discussed lumbar spine and hip anatomy, physiology and potential pathophysiology related to pt's familiar sx.     HEP:  Access Code: [email or web address]  URL: [email or web address]  Date: 11/27/2024  Prepared by: Shorty Bryant    Exercises  - Supine Lower Trunk Rotation  - 1 x daily - 7 x weekly - 3 sets - 10 reps  - Standing Back Extension at Wall  - 1 x daily - 7 x weekly - 3 sets - 10 reps  - Walking 5-10min, 2x daily. To promote mobility.   ^  Education and discussion on HEP and treatment regarding the benefits related to current condition, POC, pathophysiology, and precautions.   -Emphasized peripheralization vs centralization of sx when performing lumbar spine mobility exercises. Instructed pt to stop exercises causing peripheralization of familiar sx.         Goals:  Patient will improve Modified Oswestry Low Back Pain Disability Index score to meet minimal detectable change of improvement to improve performance of ADLs. (13pts)    Patient will be independent with home exercise program for proper self-management of condition.    Patient will improve pain free active range of motion in deficit areas for ADL completion.    Patient will improve strength in deficit areas so patient can perform ADLs with less pain.    Patient will subjectively report the ability to lift wife's w/c in and out of car without familiar sx.    Pt will subjectively report the ability to stand for >/= 2hours w/out sx provocation to complete household tasks and caregiver responsibilities such as  dishes/cooking etc.

## 2024-12-09 ENCOUNTER — TREATMENT (OUTPATIENT)
Dept: PHYSICAL THERAPY | Facility: CLINIC | Age: 84
End: 2024-12-09
Payer: MEDICARE

## 2024-12-09 DIAGNOSIS — R26.81 UNSTEADINESS ON FEET: ICD-10-CM

## 2024-12-09 DIAGNOSIS — M62.81 GENERALIZED MUSCLE WEAKNESS: ICD-10-CM

## 2024-12-09 DIAGNOSIS — M54.41 BILATERAL LOW BACK PAIN WITH BILATERAL SCIATICA, UNSPECIFIED CHRONICITY: Primary | ICD-10-CM

## 2024-12-09 DIAGNOSIS — M54.42 BILATERAL LOW BACK PAIN WITH BILATERAL SCIATICA, UNSPECIFIED CHRONICITY: Primary | ICD-10-CM

## 2024-12-09 PROCEDURE — 97110 THERAPEUTIC EXERCISES: CPT | Mod: GP

## 2024-12-09 NOTE — PROGRESS NOTES
Physical Therapy Treatment      Patient Name: Sidney Zapata  MRN: [id number]  Today's Date: 12/9/2024  Visit #: 2  Insurance:  Info: 20 COPAY, 0 DED, 3500 OOP MAX,  90 DAY 2/26/2025, AUTH REQ AFTER EVAL, SPECIAL FORM   Visit Limit: 90 day   Date Range: 2/26/25  Co-Pay: $20  Time Calculation  Start Time: 1447  Stop Time: 1520  Time Calculation (min): 33 min    1. Bilateral low back pain with bilateral sciatica, unspecified chronicity        2. Generalized muscle weakness        3. Unsteadiness on feet            ASSESSMENT:  Today's session focused on strength, endurance, and gait. Patient demonstrated poor tolerance to session this date, ending session slightly early d/t significant LE and generalized fatigue. They are demonstrating good progress in skilled rehabilitation at this time, making small improvements in LE and trunk strength as well as improvements w/his familiar LBP since last session. They are still limited by decreased muscle performance, decreased activity tolerance, pain, participation restrictions, impaired balance/gait, and difficulty with ADL completion. Patient continues to be a good candidate for skilled PT in order to further address listed impairments. Updated HEP to reflect today's session.      Goals:  Patient will improve Modified Oswestry Low Back Pain Disability Index score to meet minimal detectable change of improvement to improve performance of ADLs. (13pts)     Patient will be independent with home exercise program for proper self-management of condition.     Patient will improve pain free active range of motion in deficit areas for ADL completion.     Patient will improve strength in deficit areas so patient can perform ADLs with less pain.     Patient will subjectively report the ability to lift wife's w/c in and out of car without familiar sx.     Pt will subjectively report the ability to stand for >/= 2hours w/out sx provocation to complete household tasks and caregiver  responsibilities such as dishes/cooking etc.     PLAN:  Cont to progress trunk and LE strength as tolerated. Further assess balance/fall risk.     SUBJECTIVE:  Pt reports the back is better he gets through the day pretty good but at night it gets pretty sore. Pt reports the exercises seem to be helping and aren't causing issues. Pt reports the feeling that was in the top of his leg has gone away at this point.     OBJECTIVE:    Treatments:  Therapeutic Exercise (47290): 25 minutes  Lower Trunk Rotations to R only - x20  Supine Bridge - 3x10  Seated Hip ABD - 3x10, w/Red band  Standing Lumbar Extension w/UE support -1x10  Unilateral Row - 1x15 ea UE, Red  Standing Pallof Press - 2x10 ea direction, Red    Therapeutic Activitity (93841):   8 minutes  Walking for dynamic warmup - 5min   Sit to Stand - 3x10 w/out UE support          HEP:  Access Code: [email or web address]  URL: [email or web address]  Date: 11/27/2024  Prepared by: Shorty Bryant     Exercises  - Supine Lower Trunk Rotation  - 1 x daily - 7 x weekly - 3 sets - 10 reps  - Standing Back Extension at Wall  - 1 x daily - 7 x weekly - 3 sets - 10 reps  - Walking 5-10min, 2x daily. To promote mobility.     Access Code: [email or web address]  URL: [email or web address]  Date: 12/09/2024  Prepared by: Shorty Bryant    Exercises  - Standing Anti-Rotation Press with Anchored Resistance  - 1 x daily - 7 x weekly - 2 sets - 10 reps  - Sit to Stand Without Arm Support  - 1 x daily - 7 x weekly - 3 sets - 10 reps  ^  Education and discussion on HEP and treatment regarding the benefits related to current condition, POC, pathophysiology, and precautions.   -Emphasized peripheralization vs centralization of sx when performing lumbar spine mobility exercises. Instructed pt to stop exercises causing peripheralization of familiar sx.

## 2024-12-16 ENCOUNTER — TREATMENT (OUTPATIENT)
Dept: PHYSICAL THERAPY | Facility: CLINIC | Age: 84
End: 2024-12-16
Payer: MEDICARE

## 2024-12-16 DIAGNOSIS — M62.81 GENERALIZED MUSCLE WEAKNESS: ICD-10-CM

## 2024-12-16 DIAGNOSIS — M54.41 BILATERAL LOW BACK PAIN WITH BILATERAL SCIATICA, UNSPECIFIED CHRONICITY: Primary | ICD-10-CM

## 2024-12-16 DIAGNOSIS — M54.42 BILATERAL LOW BACK PAIN WITH BILATERAL SCIATICA, UNSPECIFIED CHRONICITY: Primary | ICD-10-CM

## 2024-12-16 DIAGNOSIS — R26.81 UNSTEADINESS ON FEET: ICD-10-CM

## 2024-12-16 PROCEDURE — 97112 NEUROMUSCULAR REEDUCATION: CPT | Mod: GP

## 2024-12-16 PROCEDURE — 97530 THERAPEUTIC ACTIVITIES: CPT | Mod: GP

## 2024-12-16 NOTE — PROGRESS NOTES
Physical Therapy Treatment      Patient Name: Sidney Zapata  MRN: [id number]  Today's Date: 12/16/2024  Visit #: 3  Insurance:  Info: 20 COPAY, 0 DED, 3500 OOP MAX,  90 DAY 2/26/2025, AUTH REQ AFTER EVAL, SPECIAL FORM   Visit Limit: 90 day   Date Range: 11/27 - 2/26/25  Co-Pay: $20  Time Calculation  Start Time: 1444  Stop Time: 1524  Time Calculation (min): 40 min    1. Bilateral low back pain with bilateral sciatica, unspecified chronicity  Follow Up In Physical Therapy      2. Generalized muscle weakness  Follow Up In Physical Therapy      3. Unsteadiness on feet  Follow Up In Physical Therapy          ASSESSMENT:  Pt has made great gains w/respect to LBP. At this time transitioning to generalized muscle strengthening and balance training to improve functional mobility and reduce risk of falls. Based on 5xSTS, 6MWT, and SF Sosa assessments pt is at an increased risk of falling. Initiated balance specific activities and HEP to perform w/maintenance LB program, pt was fatigued by at end of session but otherwise tolerated well. Pt verbalized understanding to all discussed. Pt to cont to benefit from skilled PT to improve CLOF and decrease risk of falling.     Goals:  Patient will improve Modified Oswestry Low Back Pain Disability Index score to meet minimal detectable change of improvement to improve performance of ADLs. (13pts)     Patient will be independent with home exercise program for proper self-management of condition.     Patient will improve pain free active range of motion in deficit areas for ADL completion.     Patient will improve strength in deficit areas so patient can perform ADLs with less pain.     Patient will subjectively report the ability to lift wife's w/c in and out of car without familiar sx.     Pt will subjectively report the ability to stand for >/= 2hours w/out sx provocation to complete household tasks and caregiver responsibilities such as dishes/cooking etc.     PLAN:  Cont to  progress trunk and LE strength as tolerated. Further assess balance/fall risk.     SUBJECTIVE:  Pt reports the back pain is totally gone now and he would like to transition to more balance specific training.     OBJECTIVE:  Treatments:    Therapeutic Activitity (05694):   30 minutes  6MWT = 738ft, 225m  Gait Speed = .625m/s  5xSTS = 21sec  SF Sosa = 21/28 (23 = cutoff)  ^In depth discussion of findings and implications of fall risk assessments, and purpose of HEP provided.     Sit to Stand - 2x10 w/out UE support - x1 fwd LOB requiring Yoko to correct.     Neuromuscular Re-education (43860): 10 minutes  Marching in Place - 3x20  Heel Rocking - 2x10  Standing Balance - 2x1min WBOS, EC  Standing Balance - 1x1min NBOS, EC        HEP:  Access Code: [email or web address]  URL: [email or web address]  Date: 11/27/2024  Prepared by: Shorty Bryant     Exercises  - Supine Lower Trunk Rotation  - 1 x daily - 7 x weekly - 3 sets - 10 reps  - Standing Back Extension at Wall  - 1 x daily - 7 x weekly - 3 sets - 10 reps  - Walking 5-10min, 2x daily. To promote mobility.     Access Code: [email or web address]  URL: [email or web address]  Date: 12/09/2024  Prepared by: Shorty Bryant    Exercises  - Standing Anti-Rotation Press with Anchored Resistance  - 1 x daily - 7 x weekly - 2 sets - 10 reps  - Sit to Stand Without Arm Support  - 1 x daily - 7 x weekly - 3 sets - 10 reps    Access Code: [email or web address]  URL: [email or web address]  Date: 12/16/2024  Prepared by: Shorty Bryant    Exercises  - Heel Toe Raises with Counter Support  - 1 x daily - 7 x weekly - 3 sets - 10 reps  - Standing March with Counter Support  - 1 x daily - 7 x weekly - 3 sets - 10 reps  - Wide Stance with Eyes Closed  - 1 x daily - 7 x weekly - 3 sets - 1 minute hold  ^  Education and discussion on HEP and treatment regarding the benefits related to current condition, POC, pathophysiology, and precautions.

## 2025-02-08 DIAGNOSIS — I10 PRIMARY HYPERTENSION: ICD-10-CM

## 2025-02-08 DIAGNOSIS — N40.1 BENIGN PROSTATIC HYPERPLASIA WITH URINARY FREQUENCY: ICD-10-CM

## 2025-02-08 DIAGNOSIS — E78.2 MIXED HYPERLIPIDEMIA: ICD-10-CM

## 2025-02-08 DIAGNOSIS — K21.9 GASTROESOPHAGEAL REFLUX DISEASE WITHOUT ESOPHAGITIS: ICD-10-CM

## 2025-02-08 DIAGNOSIS — R35.0 BENIGN PROSTATIC HYPERPLASIA WITH URINARY FREQUENCY: ICD-10-CM

## 2025-02-10 RX ORDER — TAMSULOSIN HYDROCHLORIDE 0.4 MG/1
0.4 CAPSULE ORAL DAILY
Qty: 100 CAPSULE | Refills: 2 | Status: SHIPPED | OUTPATIENT
Start: 2025-02-10

## 2025-02-10 RX ORDER — ATORVASTATIN CALCIUM 80 MG/1
80 TABLET, FILM COATED ORAL DAILY
Qty: 100 TABLET | Refills: 2 | Status: SHIPPED | OUTPATIENT
Start: 2025-02-10

## 2025-02-10 RX ORDER — OMEPRAZOLE 20 MG/1
20 CAPSULE, DELAYED RELEASE ORAL DAILY
Qty: 100 CAPSULE | Refills: 2 | Status: SHIPPED | OUTPATIENT
Start: 2025-02-10

## 2025-02-10 RX ORDER — ATENOLOL 25 MG/1
TABLET ORAL
Qty: 100 TABLET | Refills: 2 | Status: SHIPPED | OUTPATIENT
Start: 2025-02-10 | End: 2026-02-10

## 2025-02-10 RX ORDER — FINASTERIDE 5 MG/1
5 TABLET, FILM COATED ORAL DAILY
Qty: 100 TABLET | Refills: 2 | Status: SHIPPED | OUTPATIENT
Start: 2025-02-10

## 2025-02-11 ENCOUNTER — APPOINTMENT (OUTPATIENT)
Dept: PRIMARY CARE | Facility: CLINIC | Age: 85
End: 2025-02-11
Payer: MEDICARE

## 2025-02-11 VITALS
SYSTOLIC BLOOD PRESSURE: 110 MMHG | TEMPERATURE: 98 F | HEIGHT: 75 IN | HEART RATE: 60 BPM | BODY MASS INDEX: 22.6 KG/M2 | RESPIRATION RATE: 16 BRPM | OXYGEN SATURATION: 98 % | DIASTOLIC BLOOD PRESSURE: 60 MMHG | WEIGHT: 181.8 LBS

## 2025-02-11 DIAGNOSIS — K22.719 BARRETT'S ESOPHAGUS WITH DYSPLASIA: ICD-10-CM

## 2025-02-11 DIAGNOSIS — I25.10 CAD S/P PERCUTANEOUS CORONARY ANGIOPLASTY: ICD-10-CM

## 2025-02-11 DIAGNOSIS — Z00.00 ROUTINE GENERAL MEDICAL EXAMINATION AT HEALTH CARE FACILITY: Primary | ICD-10-CM

## 2025-02-11 DIAGNOSIS — J43.9 PULMONARY EMPHYSEMA, UNSPECIFIED EMPHYSEMA TYPE (MULTI): ICD-10-CM

## 2025-02-11 DIAGNOSIS — N40.1 BENIGN PROSTATIC HYPERPLASIA WITH URINARY FREQUENCY: ICD-10-CM

## 2025-02-11 DIAGNOSIS — E78.5 DYSLIPIDEMIA: ICD-10-CM

## 2025-02-11 DIAGNOSIS — Z98.61 CAD S/P PERCUTANEOUS CORONARY ANGIOPLASTY: ICD-10-CM

## 2025-02-11 DIAGNOSIS — F33.1 MAJOR DEPRESSIVE DISORDER, RECURRENT, MODERATE: ICD-10-CM

## 2025-02-11 DIAGNOSIS — K51.90 ULCERATIVE COLITIS WITHOUT COMPLICATIONS, UNSPECIFIED LOCATION (MULTI): ICD-10-CM

## 2025-02-11 DIAGNOSIS — L40.50 PSORIASIS WITH ARTHROPATHY (MULTI): ICD-10-CM

## 2025-02-11 DIAGNOSIS — I10 PRIMARY HYPERTENSION: ICD-10-CM

## 2025-02-11 DIAGNOSIS — R35.0 BENIGN PROSTATIC HYPERPLASIA WITH URINARY FREQUENCY: ICD-10-CM

## 2025-02-11 PROCEDURE — 1036F TOBACCO NON-USER: CPT | Performed by: INTERNAL MEDICINE

## 2025-02-11 PROCEDURE — G2211 COMPLEX E/M VISIT ADD ON: HCPCS | Performed by: INTERNAL MEDICINE

## 2025-02-11 PROCEDURE — 1157F ADVNC CARE PLAN IN RCRD: CPT | Performed by: INTERNAL MEDICINE

## 2025-02-11 PROCEDURE — 1158F ADVNC CARE PLAN TLK DOCD: CPT | Performed by: INTERNAL MEDICINE

## 2025-02-11 PROCEDURE — 99214 OFFICE O/P EST MOD 30 MIN: CPT | Performed by: INTERNAL MEDICINE

## 2025-02-11 PROCEDURE — 1159F MED LIST DOCD IN RCRD: CPT | Performed by: INTERNAL MEDICINE

## 2025-02-11 PROCEDURE — 1160F RVW MEDS BY RX/DR IN RCRD: CPT | Performed by: INTERNAL MEDICINE

## 2025-02-11 PROCEDURE — 1123F ACP DISCUSS/DSCN MKR DOCD: CPT | Performed by: INTERNAL MEDICINE

## 2025-02-11 PROCEDURE — 1170F FXNL STATUS ASSESSED: CPT | Performed by: INTERNAL MEDICINE

## 2025-02-11 PROCEDURE — 3078F DIAST BP <80 MM HG: CPT | Performed by: INTERNAL MEDICINE

## 2025-02-11 PROCEDURE — 99397 PER PM REEVAL EST PAT 65+ YR: CPT | Performed by: INTERNAL MEDICINE

## 2025-02-11 PROCEDURE — G0439 PPPS, SUBSEQ VISIT: HCPCS | Performed by: INTERNAL MEDICINE

## 2025-02-11 PROCEDURE — 3074F SYST BP LT 130 MM HG: CPT | Performed by: INTERNAL MEDICINE

## 2025-02-11 ASSESSMENT — ENCOUNTER SYMPTOMS
BRUISES/BLEEDS EASILY: 0
HEMATOLOGIC/LYMPHATIC NEGATIVE: 1
DIFFICULTY URINATING: 0
NECK STIFFNESS: 0
ENDOCRINE NEGATIVE: 1
NUMBNESS: 0
HALLUCINATIONS: 0
COLOR CHANGE: 0
NAUSEA: 0
FACIAL ASYMMETRY: 0
AGITATION: 0
VOMITING: 0
STRIDOR: 0
LIGHT-HEADEDNESS: 0
GASTROINTESTINAL NEGATIVE: 1
SINUS PAIN: 0
BACK PAIN: 1
PALPITATIONS: 0
TROUBLE SWALLOWING: 0
WHEEZING: 0
LOSS OF SENSATION IN FEET: 0
SINUS PRESSURE: 0
RESPIRATORY NEGATIVE: 1
VOICE CHANGE: 0
POLYPHAGIA: 0
DYSURIA: 0
EYE REDNESS: 0
OCCASIONAL FEELINGS OF UNSTEADINESS: 0
MYALGIAS: 0
SLEEP DISTURBANCE: 0
EYES NEGATIVE: 1
CONSTITUTIONAL NEGATIVE: 1
CONSTIPATION: 0
NECK PAIN: 0
FREQUENCY: 0
FLANK PAIN: 0
ARTHRALGIAS: 1
PSYCHIATRIC NEGATIVE: 1
NEUROLOGICAL NEGATIVE: 1
DIARRHEA: 0
CONFUSION: 0
APPETITE CHANGE: 0
SHORTNESS OF BREATH: 0
EYE PAIN: 0
ABDOMINAL PAIN: 0
DIZZINESS: 0
SORE THROAT: 0
JOINT SWELLING: 0
HEADACHES: 0
ALLERGIC/IMMUNOLOGIC NEGATIVE: 1
CHEST TIGHTNESS: 0
ACTIVITY CHANGE: 0
POLYDIPSIA: 0
UNEXPECTED WEIGHT CHANGE: 0
DEPRESSION: 0
ADENOPATHY: 0
COUGH: 0
TREMORS: 0
SEIZURES: 0
SPEECH DIFFICULTY: 0
WOUND: 0
EYE DISCHARGE: 0
BLOOD IN STOOL: 0
NERVOUS/ANXIOUS: 0
WEAKNESS: 0
CARDIOVASCULAR NEGATIVE: 1

## 2025-02-11 ASSESSMENT — ACTIVITIES OF DAILY LIVING (ADL)
TAKING_MEDICATION: INDEPENDENT
BATHING: INDEPENDENT
DRESSING: INDEPENDENT
MANAGING_FINANCES: INDEPENDENT
GROCERY_SHOPPING: INDEPENDENT
DOING_HOUSEWORK: INDEPENDENT

## 2025-02-11 NOTE — PROGRESS NOTES
Subjective   Reason for Visit: Sidney Zapata is an 85 y.o. male here for a Medicare Wellness visit.     Past Medical, Surgical, and Family History reviewed and updated in chart.    Reviewed all medications by prescribing practitioner or clinical pharmacist (such as prescriptions, OTCs, herbal therapies and supplements) and documented in the medical record.    [facility]    Patient Care Team:  Alessia Taylor MD PhD as PCP - General  Alessia Taylor MD PhD as PCP - [employer] PCP  Tracy M Schwab, APRN-CNP as Nurse Practitioner (Cardiology)     Patient comes for Medicare Wellness, Physical Exam and Follow up visit.     Patient has been feeling pretty good and has been complaint with prescribed medications.    We reviewed blood work including CBC, CMP, TSH, Lipid Panel, HbA1c, Vit D level and PSA done in Feb 2024.   Results within acceptable range.     X-ray of lumbar spine showed multilevel degenerative changes (Nov 2024).  No significant change since 2019.   No fracture or dislocation.    Pneumonia Vacc: declined h/o allergy  Advance Directives: Healthcare POA not on file. Patient advised to complete forms and bring/mail to the office.   POA discussed, confirmed and documented in patient's  EPIC record.       This is 85 yo male with very complex medical problems including HTN, HLD, CAD, GERD, Geoff's esophagus, Ulcerative colitis, depression, COPD, back pain, OA.     We reviewed his medical conditions during today's visit.    Patient has been taking care of his wife who suffers from multiple medical problems and needs 24/7 care.   She has significant problems ambulating and relies on 's care, she needs dialysis 3 times per week,   provides transportation and assists her.    His back pain improved after PT which patient was undergoing in Nov and Dec 2024     Escitalopram helps with depression and anxiety symptoms.      CT chest in 2016 showed stable, small pulmonary nodules and large  hiatal hernia.      His colonoscopy and EGD in 2016 were acceptable. there was no evidence of Ashton's esophagus.   Symptoms of ulcerative colitis have been controlled with medication.   Patient follows with GI Dr. Goldsmith regarding Ulcerative colitis.  Follows with  cardiology NP Tracy Schwab.  Patient has been compliant with his medications, his BP has been well controlled. CAD-stable. He had coronary stent put in 12 years ago.      BPH symptoms have been controlled with Tamsulosin.        Review of Systems   Constitutional: Negative.  Negative for activity change, appetite change and unexpected weight change.   HENT: Negative.  Negative for congestion, ear discharge, ear pain, hearing loss, mouth sores, nosebleeds, sinus pressure, sinus pain, sore throat, trouble swallowing and voice change.    Eyes: Negative.  Negative for pain, discharge, redness and visual disturbance.   Respiratory: Negative.  Negative for cough, chest tightness, shortness of breath, wheezing and stridor.    Cardiovascular: Negative.  Negative for chest pain, palpitations and leg swelling.   Gastrointestinal: Negative.  Negative for abdominal pain, blood in stool, constipation, diarrhea, nausea and vomiting.   Endocrine: Negative.  Negative for polydipsia, polyphagia and polyuria.   Genitourinary: Negative.  Negative for difficulty urinating, dysuria, flank pain, frequency and urgency.   Musculoskeletal:  Positive for arthralgias and back pain. Negative for gait problem, joint swelling, myalgias, neck pain and neck stiffness.   Skin: Negative.  Negative for color change, rash and wound.   Allergic/Immunologic: Negative.  Negative for environmental allergies, food allergies and immunocompromised state.   Neurological: Negative.  Negative for dizziness, tremors, seizures, syncope, facial asymmetry, speech difficulty, weakness, light-headedness, numbness and headaches.   Hematological: Negative.  Negative for adenopathy. Does not bruise/bleed  "easily.   Psychiatric/Behavioral: Negative.  Negative for agitation, behavioral problems, confusion, hallucinations, sleep disturbance and suicidal ideas. The patient is not nervous/anxious.    All other systems reviewed and are negative.      Objective   Vitals:  /60 (BP Location: Right arm, Patient Position: Sitting, BP Cuff Size: Adult)   Pulse 60   Temp 36.7 °C (98 °F) (Temporal)   Resp 16   Ht 1.905 m (6' 3\")   Wt 82.5 kg (181 lb 12.8 oz)   SpO2 98%   BMI 22.72 kg/m²       Physical Exam  Vitals and nursing note reviewed.   Constitutional:       General: He is not in acute distress.     Appearance: Normal appearance.   HENT:      Head: Normocephalic and atraumatic.      Right Ear: Tympanic membrane, ear canal and external ear normal.      Left Ear: Tympanic membrane, ear canal and external ear normal.      Nose: Nose normal. No congestion or rhinorrhea.      Mouth/Throat:      Mouth: Mucous membranes are moist.      Pharynx: Oropharynx is clear.   Eyes:      General:         Right eye: No discharge.         Left eye: No discharge.      Extraocular Movements: Extraocular movements intact.      Conjunctiva/sclera: Conjunctivae normal.      Pupils: Pupils are equal, round, and reactive to light.   Cardiovascular:      Rate and Rhythm: Normal rate and regular rhythm.      Pulses: Normal pulses.      Heart sounds: Normal heart sounds. No murmur heard.     No friction rub. No gallop.   Pulmonary:      Effort: Pulmonary effort is normal. No respiratory distress.      Breath sounds: Normal breath sounds. No stridor. No wheezing, rhonchi or rales.   Chest:      Chest wall: No tenderness.   Abdominal:      General: Bowel sounds are normal.      Palpations: Abdomen is soft. There is no mass.      Tenderness: There is no abdominal tenderness. There is no guarding or rebound.   Musculoskeletal:         General: No swelling or deformity. Normal range of motion.      Cervical back: Normal range of motion and neck " supple. No rigidity or tenderness.      Right lower leg: No edema.      Left lower leg: No edema.   Lymphadenopathy:      Cervical: No cervical adenopathy.   Skin:     General: Skin is warm and dry.      Coloration: Skin is not jaundiced.      Findings: No bruising or erythema.   Neurological:      General: No focal deficit present.      Mental Status: He is alert and oriented to person, place, and time. Mental status is at baseline.      Cranial Nerves: No cranial nerve deficit.      Motor: No weakness.      Coordination: Coordination normal.      Gait: Gait normal.   Psychiatric:         Mood and Affect: Mood normal.         Behavior: Behavior normal.         Thought Content: Thought content normal.         Judgment: Judgment normal.         Assessment & Plan  Routine general medical examination at health care facility    Orders:    1 Year Follow Up In [facility] - PCP - Wellness Exam; Future    CAD S/P percutaneous coronary angioplasty  Clinically stable. F/up with cardiology.    Orders:    CBC; Future    Dyslipidemia  Continue statin.    Orders:    Comprehensive Metabolic Panel; Future    Lipid Panel; Future    TSH with reflex to Free T4 if abnormal; Future    Primary hypertension  Controlled . Continue current treatment.         Ashton's esophagus with dysplasia  Clinically stable. F/up with GI.         Benign prostatic hyperplasia with urinary frequency  Clinically stable. F/up with urology.          Major depressive disorder, recurrent, moderate  Clinically stable. Will monitor.          Psoriasis with arthropathy (Multi)  Stable. Will monitor.         Pulmonary emphysema, unspecified emphysema type (Multi)  Clinically stable. Will monitor.         Ulcerative colitis without complications, unspecified location (Multi)  Stable. F/up with GI.        It was a pleasure to see you today.  I would like to remind you about importance of a healthy lifestyle in order to improve your well-being and live  longer.  Try to engage in physical activities for at least 150 minutes per week.  Eat about 10 servings of fruits and vegetables daily. My advice is 2 servings of fruits and 8 servings of vegetables.  For vegetables choose at least half of them green and at least half of them fresh.  Please avoid sugar, salt, fried food and saturated fat.    I spent a total of 30 minutes on the date of service for follow up visit, which included preparing to see the patient, face-to-face patient care, completing clinical documentation, obtaining and/or reviewing separately obtained history, performing a medically appropriate examination, counseling and educating the patient/family/caregiver, ordering medications, tests, or procedures, communicating with other health care providers (not separately reported), independently interpreting results (not separately reported), communicating results to the patient/family/caregiver, and care coordination (not separately reported).    F/up in 6 months or sooner if needed.

## 2025-02-11 NOTE — ASSESSMENT & PLAN NOTE
Continue statin.    Orders:    Comprehensive Metabolic Panel; Future    Lipid Panel; Future    TSH with reflex to Free T4 if abnormal; Future

## 2025-02-11 NOTE — ASSESSMENT & PLAN NOTE
Stable. F/up with GI.        It was a pleasure to see you today.  I would like to remind you about importance of a healthy lifestyle in order to improve your well-being and live longer.  Try to engage in physical activities for at least 150 minutes per week.  Eat about 10 servings of fruits and vegetables daily. My advice is 2 servings of fruits and 8 servings of vegetables.  For vegetables choose at least half of them green and at least half of them fresh.  Please avoid sugar, salt, fried food and saturated fat.    I spent a total of 30 minutes on the date of service for follow up visit, which included preparing to see the patient, face-to-face patient care, completing clinical documentation, obtaining and/or reviewing separately obtained history, performing a medically appropriate examination, counseling and educating the patient/family/caregiver, ordering medications, tests, or procedures, communicating with other health care providers (not separately reported), independently interpreting results (not separately reported), communicating results to the patient/family/caregiver, and care coordination (not separately reported).    F/up in 6 months or sooner if needed.

## 2025-02-20 ENCOUNTER — APPOINTMENT (OUTPATIENT)
Dept: CARDIOLOGY | Facility: CLINIC | Age: 85
End: 2025-02-20
Payer: MEDICARE

## 2025-02-20 VITALS
DIASTOLIC BLOOD PRESSURE: 64 MMHG | HEIGHT: 75 IN | WEIGHT: 179 LBS | HEART RATE: 63 BPM | BODY MASS INDEX: 22.26 KG/M2 | SYSTOLIC BLOOD PRESSURE: 120 MMHG

## 2025-02-20 DIAGNOSIS — Z98.61 CAD S/P PERCUTANEOUS CORONARY ANGIOPLASTY: Primary | ICD-10-CM

## 2025-02-20 DIAGNOSIS — I25.10 CAD S/P PERCUTANEOUS CORONARY ANGIOPLASTY: Primary | ICD-10-CM

## 2025-02-20 DIAGNOSIS — I10 PRIMARY HYPERTENSION: ICD-10-CM

## 2025-02-20 DIAGNOSIS — I44.0 FIRST DEGREE ATRIOVENTRICULAR BLOCK: ICD-10-CM

## 2025-02-20 DIAGNOSIS — E78.5 DYSLIPIDEMIA: ICD-10-CM

## 2025-02-20 RX ORDER — NAPROXEN SODIUM 220 MG/1
81 TABLET, FILM COATED ORAL DAILY
Start: 2025-02-20 | End: 2026-02-20

## 2025-02-20 NOTE — PROGRESS NOTES
Name : Sidney Zapata   : 1940   MRN : [id number]   ENC Date : 2025    CC: Annual Exam     HPI:    Sidney Zapata is a 85 y.o. male with PMHx sig for CAD s/p PCI & stenting of RCA , OM1 & D1 (2008), HTN, DLD, COPD, Ashton's Esophagus who presents today for cardiovascular management.    He has done very well since his last visit. Reports no major health issues and has had no hospitalizations. Denies any chest pain, pressure, SOB/CAMPA, PND, orthopnea, LE edema, palpitations, lightheadedness, dizziness, or syncope at rest or with exertions. He is compliant with his medications and reports no side effects. He has been physically active., caretaker of wife who is on dialysis & in a wheelchair. Lives in a split level going up & down stairs to do laundry,  pushing wife in chair, dishes etc... But no routine exercise.    CV Diagnostics:  Echo 3/28/24: EF 55-60%, impaired relaxation, aortic sclerosis with normal leaflet mobility     EKG 2/15/23: Sinus Bradycardia, HR 54 bpm, 1st degree AVB  Echo      ROS: unless otherwise noted in the history of present illness, all other systems were reviewed and they are negative for complaints     Allergies:  Prevnar 13 (pf) [pneumoc 13-drea conj-dip cr(pf)] and Penicillins    Current Outpatient Medications   Medication Instructions    aspirin 81 mg, oral, Daily    atenolol (Tenormin) 25 mg tablet TAKE 1 TABLET BY MOUTH DAILY    atorvastatin (LIPITOR) 80 mg, oral, Daily    escitalopram (LEXAPRO) 20 mg, oral, Daily    finasteride (PROSCAR) 5 mg, oral, Daily    lisinopril 10 mg, oral, Daily    omeprazole (PRILOSEC) 20 mg, oral, Daily    tamsulosin (FLOMAX) 0.4 mg, oral, Daily    traZODone (DESYREL)  mg, oral, Nightly PRN, FOR INSOMNIA        Last Labs:  CBC  Lab Results   Component Value Date    WBC 6.1 2024    HGB 12.9 (L) 2024    HCT 40.7 (L) 2024    MCV 96 2024     2024       CMP  Lab Results   Component Value Date     "CALCIUM 9.1 02/28/2024    PROT 6.6 02/28/2024    ALBUMIN 4.0 02/28/2024    AST 21 02/28/2024    ALT 16 02/28/2024    ALKPHOS 71 02/28/2024    BILITOT 0.9 02/28/2024       BMP   Lab Results   Component Value Date     02/28/2024    K 4.0 02/28/2024     02/28/2024    CO2 29 02/28/2024    GLUCOSE 100 (H) 02/28/2024    BUN 18 02/28/2024    CREATININE 1.09 02/28/2024       LIPID PANEL   Lab Results   Component Value Date    CHOL 107 02/28/2024    TRIG 70 02/28/2024    HDL 46.3 02/28/2024    CHHDL 2.3 02/28/2024    LDLF 43 10/05/2022    VLDL 14 02/28/2024    NHDL 61 02/28/2024       RENAL FUNCTION PANEL   Lab Results   Component Value Date    GLUCOSE 100 (H) 02/28/2024     02/28/2024    K 4.0 02/28/2024     02/28/2024    CO2 29 02/28/2024    ANIONGAP 12 02/28/2024    BUN 18 02/28/2024    CREATININE 1.09 02/28/2024    GFRMALE 65 10/05/2022    CALCIUM 9.1 02/28/2024    ALBUMIN 4.0 02/28/2024        No results found for: \"BNP\", \"HGBA1C\"  I have reviewed the above labs & diagnostics    Last Recorded Vitals:  Vitals:    02/20/25 0900   BP: 120/64   BP Location: Left arm   Patient Position: Sitting   Pulse: 63   Weight: 81.2 kg (179 lb)   Height: 1.905 m (6' 3\")     Physical Exam:  On exam Mr. Sidney Zapata appears his stated age, is alert and oriented x3, and in no acute distress. His sclera are anicteric and his oropharynx has moist mucous membranes. His neck is supple and without thyromegaly. The JVP is ~5 cm of water above the right atrium. His cardiac exam has regular rhythm, normal S1, S2. No S3/4. There are no murmurs. His lungs are clear to auscultation bilaterally and there is no dullness to percussion. His abdomen is soft, nontender with normoactive bowel sounds. There is no HJR. The extremities are warm and without edema. The skin is dry. There is no rash present. The distal pulses are 2-3+ in all four extremities. His mood and affect are appropriate for todays encounter.     No " Bruits    Assessment/Plan:  1. CAD s/p PCI & stenting of RCA , OM1 & D1 (01/2008). No angina. No need for routine stress testing based on ISCHEMIA trial.  - c/w ASA 81mg every day   - c/w Atorvastatin 80mg every day, check liver enzymes  - c/w Atenolol 25mg every day  - echocardiogram about every ~ 5 yrs or sooner if needed    2. Hypertension. /64 mmHg today. Well controlled.  - c/w atenolol & lisinopril. Check renal function     3. DLD. Last lipid panel with LDL at goal < 70 (47). Repeat annual lipid panel.    4. First Degree AVB. Monitor    Will follow up with Kit after labs result. Follow up with Dr. Perera in 6 month to establish collaborative care & me in 1 year.     Tracy M Schwab, NA-CNP

## 2025-02-25 LAB
ALBUMIN SERPL-MCNC: 4.2 G/DL (ref 3.6–5.1)
ALP SERPL-CCNC: 71 U/L (ref 35–144)
ALT SERPL-CCNC: 15 U/L (ref 9–46)
ANION GAP SERPL CALCULATED.4IONS-SCNC: 7 MMOL/L (CALC) (ref 7–17)
AST SERPL-CCNC: 19 U/L (ref 10–35)
BILIRUB SERPL-MCNC: 0.9 MG/DL (ref 0.2–1.2)
BUN SERPL-MCNC: 18 MG/DL (ref 7–25)
CALCIUM SERPL-MCNC: 9 MG/DL (ref 8.6–10.3)
CHLORIDE SERPL-SCNC: 102 MMOL/L (ref 98–110)
CHOLEST SERPL-MCNC: 135 MG/DL
CHOLEST/HDLC SERPL: 2.4 (CALC)
CO2 SERPL-SCNC: 32 MMOL/L (ref 20–32)
CREAT SERPL-MCNC: 1.31 MG/DL (ref 0.7–1.22)
EGFRCR SERPLBLD CKD-EPI 2021: 53 ML/MIN/1.73M2
ERYTHROCYTE [DISTWIDTH] IN BLOOD BY AUTOMATED COUNT: 11.8 % (ref 11–15)
GLUCOSE SERPL-MCNC: 94 MG/DL (ref 65–99)
HCT VFR BLD AUTO: 41.7 % (ref 38.5–50)
HDLC SERPL-MCNC: 57 MG/DL
HGB BLD-MCNC: 13.4 G/DL (ref 13.2–17.1)
LDLC SERPL CALC-MCNC: 64 MG/DL (CALC)
MCH RBC QN AUTO: 31.1 PG (ref 27–33)
MCHC RBC AUTO-ENTMCNC: 32.1 G/DL (ref 32–36)
MCV RBC AUTO: 96.8 FL (ref 80–100)
NONHDLC SERPL-MCNC: 78 MG/DL (CALC)
PLATELET # BLD AUTO: 203 THOUSAND/UL (ref 140–400)
PMV BLD REES-ECKER: 10.6 FL (ref 7.5–12.5)
POTASSIUM SERPL-SCNC: 4.4 MMOL/L (ref 3.5–5.3)
PROT SERPL-MCNC: 6.5 G/DL (ref 6.1–8.1)
RBC # BLD AUTO: 4.31 MILLION/UL (ref 4.2–5.8)
SODIUM SERPL-SCNC: 141 MMOL/L (ref 135–146)
TRIGL SERPL-MCNC: 61 MG/DL
TSH SERPL-ACNC: 2.1 MIU/L (ref 0.4–4.5)
WBC # BLD AUTO: 6.6 THOUSAND/UL (ref 3.8–10.8)

## 2025-03-18 ENCOUNTER — APPOINTMENT (OUTPATIENT)
Dept: DERMATOLOGY | Facility: CLINIC | Age: 85
End: 2025-03-18
Payer: MEDICARE

## 2025-03-18 DIAGNOSIS — L57.9 SKIN CHANGES DUE TO CHRONIC EXPOSURE TO NONIONIZING RADIATION: ICD-10-CM

## 2025-03-18 DIAGNOSIS — L81.4 LENTIGO: ICD-10-CM

## 2025-03-18 DIAGNOSIS — L57.0 ACTINIC KERATOSES: ICD-10-CM

## 2025-03-18 DIAGNOSIS — D48.5 NEOPLASM OF UNCERTAIN BEHAVIOR OF SKIN: Primary | ICD-10-CM

## 2025-03-18 DIAGNOSIS — D18.01 HEMANGIOMA OF SKIN: ICD-10-CM

## 2025-03-18 DIAGNOSIS — Z12.83 SCREENING EXAM FOR SKIN CANCER: ICD-10-CM

## 2025-03-18 DIAGNOSIS — L82.1 SEBORRHEIC KERATOSIS: ICD-10-CM

## 2025-03-18 PROCEDURE — 1159F MED LIST DOCD IN RCRD: CPT | Performed by: STUDENT IN AN ORGANIZED HEALTH CARE EDUCATION/TRAINING PROGRAM

## 2025-03-18 PROCEDURE — 1123F ACP DISCUSS/DSCN MKR DOCD: CPT | Performed by: STUDENT IN AN ORGANIZED HEALTH CARE EDUCATION/TRAINING PROGRAM

## 2025-03-18 PROCEDURE — 17004 DESTROY PREMAL LESIONS 15/>: CPT | Performed by: STUDENT IN AN ORGANIZED HEALTH CARE EDUCATION/TRAINING PROGRAM

## 2025-03-18 PROCEDURE — 99203 OFFICE O/P NEW LOW 30 MIN: CPT | Performed by: STUDENT IN AN ORGANIZED HEALTH CARE EDUCATION/TRAINING PROGRAM

## 2025-03-18 PROCEDURE — 1157F ADVNC CARE PLAN IN RCRD: CPT | Performed by: STUDENT IN AN ORGANIZED HEALTH CARE EDUCATION/TRAINING PROGRAM

## 2025-03-18 PROCEDURE — 11102 TANGNTL BX SKIN SINGLE LES: CPT | Performed by: STUDENT IN AN ORGANIZED HEALTH CARE EDUCATION/TRAINING PROGRAM

## 2025-03-18 NOTE — PROGRESS NOTES
Subjective     Sidney Zapata is a 85 y.o. male who presents for the following: Skin Check (Waist up skin exam. No Hx of skin cancers. ).     Review of Systems:  No other skin or systemic complaints other than what is documented elsewhere in the note.    The following portions of the chart were reviewed this encounter and updated as appropriate:          Skin Cancer History  No skin cancer on file.      Specialty Problems          Dermatology Problems    Skin rash    Skin cancer screening        Objective   Well appearing patient in no apparent distress; mood and affect are within normal limits.    A focused skin examination was performed. All findings within normal limits unless otherwise noted below.    Assessment/Plan   1. Neoplasm of uncertain behavior of skin  Left Nasal Sidewall  7 mm pink papule           Lesion biopsy  Type of biopsy: tangential    Informed consent: discussed and consent obtained    Timeout: patient name, date of birth, surgical site, and procedure verified    Procedure prep:  Patient was prepped and draped  Anesthesia: the lesion was anesthetized in a standard fashion    Anesthetic:  1% lidocaine w/ epinephrine 1-100,000 local infiltration  Instrument used: DermaBlade    Hemostasis achieved with: aluminum chloride    Outcome: patient tolerated procedure well    Post-procedure details: sterile dressing applied and wound care instructions given    Dressing type: petrolatum and bandage      Staff Communication: Dermatology Local Anesthesia: 1 % Lidocaine / Epinephrine - Amount: 3 ml    Specimen 1 - Dermatopathology- DERM LAB  Differential Diagnosis: r/o bcc  Check Margins Yes/No?:    Comments:    Dermpath Lab: Routine Histopathology (formalin-fixed tissue)    Concerning lesion found on exam. DDX for lesion includes: r/o bcc. The need for biopsy to aid in diagnosis was discussed and recommended. Risks and benefits of biopsy were reviewed. See procedure note.    2. Skin changes due to chronic  exposure to nonionizing radiation  Mottled pigmentation, telangiectasias and brown reticular macules in sun exposed areas on the face, extremities, trunk    The risk of chronic, cumulative sun damage and risk of development of skin cancer was reviewed with the patient today. Discussed important of sun protection with sun protective clothing and/or broad spectrum sunscreen spf 30 or above.  Warning signs of skin cancer were reviewed. Patient to contact office should they notice any new or changing pre-existing skin lesion.    Related Procedures  Follow Up In Dermatology - Established Patient    3. Screening exam for skin cancer    Related Procedures  Follow Up In Dermatology - Established Patient    4. Actinic keratoses (16)  Left Buccal Cheek, Left Forehead (3), Left Temple (2), Left Zygomatic Area, Mid Forehead (4), Right Forehead (2), Right Temple (2), Right Zygomatic Area  Erythematous gritty macule(s)    Lesions are due to chronic, cumulative sun damage over time and are pre-cancerous. They have a risk of developing into squamous cell carcinoma and therefore treatment recommendations were offered and discussed with the patient. Discussed LN2 & topical chemotherapy creams, risks and benefits of each. The risks and benefits of LN2 were reviewed including incomplete removal, crusting, blister hypo and/or hyperpigmentation, scarring. The patient elected for LN2 today.    Destr of lesion - Left Buccal Cheek, Left Forehead (3), Left Temple (2), Left Zygomatic Area, Mid Forehead (4), Right Forehead (2), Right Temple (2), Right Zygomatic Area  Complexity: simple    Destruction method: cryotherapy    Informed consent: discussed and consent obtained    Lesion destroyed using liquid nitrogen: Yes    Region frozen until ice ball extended beyond lesion: Yes    Cryotherapy cycles:  1  Outcome: patient tolerated procedure well with no complications    Post-procedure details: wound care instructions given      5.  Lentigo  Scattered tan macules in sun-exposed areas.    Benign nature of these skin lesions reviewed and relation to sun exposure discussed. Reassurance provided. Reviewed warning signs of skin cancer.    6. Hemangioma of skin  Bright red papules    Discussed benign nature of condition, reassured. Reviewed warning signs of skin cancer with patient.    7. Seborrheic keratosis  Stuck on verrucous, tan-brown papules and plaques.      The benign nature of these skin lesions were reviewed with the patient today and reassurance was provided. Patient advised to return for f/u if the lesions change in size, shape, color, become painful, tender, itch or bleed.

## 2025-03-20 LAB
LABORATORY COMMENT REPORT: NORMAL
PATH REPORT.FINAL DX SPEC: NORMAL
PATH REPORT.GROSS SPEC: NORMAL
PATH REPORT.MICROSCOPIC SPEC OTHER STN: NORMAL
PATH REPORT.RELEVANT HX SPEC: NORMAL
PATH REPORT.TOTAL CANCER: NORMAL

## 2025-04-10 ENCOUNTER — TELEPHONE (OUTPATIENT)
Dept: CARDIOLOGY | Facility: CLINIC | Age: 85
End: 2025-04-10
Payer: MEDICARE

## 2025-04-10 ENCOUNTER — HOSPITAL ENCOUNTER (OUTPATIENT)
Facility: HOSPITAL | Age: 85
Setting detail: OBSERVATION
Discharge: HOME | End: 2025-04-11
Attending: EMERGENCY MEDICINE | Admitting: STUDENT IN AN ORGANIZED HEALTH CARE EDUCATION/TRAINING PROGRAM
Payer: MEDICARE

## 2025-04-10 ENCOUNTER — APPOINTMENT (OUTPATIENT)
Dept: CARDIOLOGY | Facility: HOSPITAL | Age: 85
End: 2025-04-10
Payer: MEDICARE

## 2025-04-10 ENCOUNTER — APPOINTMENT (OUTPATIENT)
Dept: RADIOLOGY | Facility: HOSPITAL | Age: 85
End: 2025-04-10
Payer: MEDICARE

## 2025-04-10 DIAGNOSIS — R07.9 CHEST PAIN, UNSPECIFIED TYPE: Primary | ICD-10-CM

## 2025-04-10 DIAGNOSIS — I20.81 ANGINA PECTORIS WITH CORONARY MICROVASCULAR DYSFUNCTION: ICD-10-CM

## 2025-04-10 DIAGNOSIS — I25.10 CORONARY ARTERY DISEASE INVOLVING NATIVE HEART, UNSPECIFIED VESSEL OR LESION TYPE, UNSPECIFIED WHETHER ANGINA PRESENT: ICD-10-CM

## 2025-04-10 DIAGNOSIS — R11.0 NAUSEA: ICD-10-CM

## 2025-04-10 LAB
ALBUMIN SERPL BCP-MCNC: 4.3 G/DL (ref 3.4–5)
ALP SERPL-CCNC: 69 U/L (ref 33–136)
ALT SERPL W P-5'-P-CCNC: 18 U/L (ref 10–52)
ANION GAP SERPL CALC-SCNC: 10 MMOL/L (ref 10–20)
AST SERPL W P-5'-P-CCNC: 19 U/L (ref 9–39)
BASOPHILS # BLD AUTO: 0.03 X10*3/UL (ref 0–0.1)
BASOPHILS NFR BLD AUTO: 0.4 %
BILIRUB SERPL-MCNC: 0.6 MG/DL (ref 0–1.2)
BNP SERPL-MCNC: 88 PG/ML (ref 0–99)
BUN SERPL-MCNC: 21 MG/DL (ref 6–23)
CALCIUM SERPL-MCNC: 8.9 MG/DL (ref 8.6–10.3)
CARDIAC TROPONIN I PNL SERPL HS: 6 NG/L (ref 0–20)
CARDIAC TROPONIN I PNL SERPL HS: 6 NG/L (ref 0–20)
CHLORIDE SERPL-SCNC: 104 MMOL/L (ref 98–107)
CO2 SERPL-SCNC: 27 MMOL/L (ref 21–32)
CREAT SERPL-MCNC: 1.07 MG/DL (ref 0.5–1.3)
D DIMER PPP FEU-MCNC: 480 NG/ML FEU
EGFRCR SERPLBLD CKD-EPI 2021: 68 ML/MIN/1.73M*2
EOSINOPHIL # BLD AUTO: 0.26 X10*3/UL (ref 0–0.4)
EOSINOPHIL NFR BLD AUTO: 3.3 %
ERYTHROCYTE [DISTWIDTH] IN BLOOD BY AUTOMATED COUNT: 12.6 % (ref 11.5–14.5)
GLUCOSE SERPL-MCNC: 136 MG/DL (ref 74–99)
HCT VFR BLD AUTO: 40.5 % (ref 41–52)
HGB BLD-MCNC: 12.9 G/DL (ref 13.5–17.5)
IMM GRANULOCYTES # BLD AUTO: 0.02 X10*3/UL (ref 0–0.5)
IMM GRANULOCYTES NFR BLD AUTO: 0.3 % (ref 0–0.9)
LYMPHOCYTES # BLD AUTO: 1.52 X10*3/UL (ref 0.8–3)
LYMPHOCYTES NFR BLD AUTO: 19.5 %
MAGNESIUM SERPL-MCNC: 1.81 MG/DL (ref 1.6–2.4)
MCH RBC QN AUTO: 30.9 PG (ref 26–34)
MCHC RBC AUTO-ENTMCNC: 31.9 G/DL (ref 32–36)
MCV RBC AUTO: 97 FL (ref 80–100)
MONOCYTES # BLD AUTO: 0.54 X10*3/UL (ref 0.05–0.8)
MONOCYTES NFR BLD AUTO: 6.9 %
NEUTROPHILS # BLD AUTO: 5.43 X10*3/UL (ref 1.6–5.5)
NEUTROPHILS NFR BLD AUTO: 69.6 %
NRBC BLD-RTO: 0 /100 WBCS (ref 0–0)
PLATELET # BLD AUTO: 183 X10*3/UL (ref 150–450)
POTASSIUM SERPL-SCNC: 4.1 MMOL/L (ref 3.5–5.3)
PROT SERPL-MCNC: 6.9 G/DL (ref 6.4–8.2)
RBC # BLD AUTO: 4.17 X10*6/UL (ref 4.5–5.9)
SODIUM SERPL-SCNC: 137 MMOL/L (ref 136–145)
WBC # BLD AUTO: 7.8 X10*3/UL (ref 4.4–11.3)

## 2025-04-10 PROCEDURE — 99223 1ST HOSP IP/OBS HIGH 75: CPT | Performed by: NURSE PRACTITIONER

## 2025-04-10 PROCEDURE — 99285 EMERGENCY DEPT VISIT HI MDM: CPT | Performed by: EMERGENCY MEDICINE

## 2025-04-10 PROCEDURE — 93010 ELECTROCARDIOGRAM REPORT: CPT | Performed by: INTERNAL MEDICINE

## 2025-04-10 PROCEDURE — 36415 COLL VENOUS BLD VENIPUNCTURE: CPT

## 2025-04-10 PROCEDURE — 85025 COMPLETE CBC W/AUTO DIFF WBC: CPT

## 2025-04-10 PROCEDURE — 2500000001 HC RX 250 WO HCPCS SELF ADMINISTERED DRUGS (ALT 637 FOR MEDICARE OP): Performed by: STUDENT IN AN ORGANIZED HEALTH CARE EDUCATION/TRAINING PROGRAM

## 2025-04-10 PROCEDURE — 80053 COMPREHEN METABOLIC PANEL: CPT

## 2025-04-10 PROCEDURE — 2500000004 HC RX 250 GENERAL PHARMACY W/ HCPCS (ALT 636 FOR OP/ED): Performed by: NURSE PRACTITIONER

## 2025-04-10 PROCEDURE — 83735 ASSAY OF MAGNESIUM: CPT

## 2025-04-10 PROCEDURE — 85379 FIBRIN DEGRADATION QUANT: CPT

## 2025-04-10 PROCEDURE — 93005 ELECTROCARDIOGRAM TRACING: CPT

## 2025-04-10 PROCEDURE — 96372 THER/PROPH/DIAG INJ SC/IM: CPT | Performed by: NURSE PRACTITIONER

## 2025-04-10 PROCEDURE — G0378 HOSPITAL OBSERVATION PER HR: HCPCS

## 2025-04-10 PROCEDURE — 71045 X-RAY EXAM CHEST 1 VIEW: CPT | Performed by: RADIOLOGY

## 2025-04-10 PROCEDURE — 83880 ASSAY OF NATRIURETIC PEPTIDE: CPT

## 2025-04-10 PROCEDURE — 2500000001 HC RX 250 WO HCPCS SELF ADMINISTERED DRUGS (ALT 637 FOR MEDICARE OP)

## 2025-04-10 PROCEDURE — 71045 X-RAY EXAM CHEST 1 VIEW: CPT

## 2025-04-10 PROCEDURE — 84484 ASSAY OF TROPONIN QUANT: CPT

## 2025-04-10 RX ORDER — ENOXAPARIN SODIUM 100 MG/ML
40 INJECTION SUBCUTANEOUS EVERY 24 HOURS
Status: DISCONTINUED | OUTPATIENT
Start: 2025-04-10 | End: 2025-04-11 | Stop reason: HOSPADM

## 2025-04-10 RX ORDER — ATORVASTATIN CALCIUM 80 MG/1
80 TABLET, FILM COATED ORAL NIGHTLY
Status: DISCONTINUED | OUTPATIENT
Start: 2025-04-10 | End: 2025-04-11 | Stop reason: HOSPADM

## 2025-04-10 RX ORDER — PANTOPRAZOLE SODIUM 40 MG/1
40 TABLET, DELAYED RELEASE ORAL
Status: DISCONTINUED | OUTPATIENT
Start: 2025-04-11 | End: 2025-04-11 | Stop reason: HOSPADM

## 2025-04-10 RX ORDER — FINASTERIDE 5 MG/1
5 TABLET, FILM COATED ORAL DAILY
Status: DISCONTINUED | OUTPATIENT
Start: 2025-04-11 | End: 2025-04-11 | Stop reason: HOSPADM

## 2025-04-10 RX ORDER — ESCITALOPRAM OXALATE 20 MG/1
20 TABLET ORAL DAILY
Status: DISCONTINUED | OUTPATIENT
Start: 2025-04-11 | End: 2025-04-11 | Stop reason: HOSPADM

## 2025-04-10 RX ORDER — NAPROXEN SODIUM 220 MG/1
81 TABLET, FILM COATED ORAL DAILY
Status: DISCONTINUED | OUTPATIENT
Start: 2025-04-11 | End: 2025-04-11 | Stop reason: HOSPADM

## 2025-04-10 RX ORDER — ATENOLOL 25 MG/1
25 TABLET ORAL DAILY
Status: DISCONTINUED | OUTPATIENT
Start: 2025-04-11 | End: 2025-04-11 | Stop reason: HOSPADM

## 2025-04-10 RX ORDER — LISINOPRIL 10 MG/1
10 TABLET ORAL DAILY
Status: DISCONTINUED | OUTPATIENT
Start: 2025-04-10 | End: 2025-04-11 | Stop reason: HOSPADM

## 2025-04-10 RX ORDER — TAMSULOSIN HYDROCHLORIDE 0.4 MG/1
0.4 CAPSULE ORAL DAILY
Status: DISCONTINUED | OUTPATIENT
Start: 2025-04-11 | End: 2025-04-11 | Stop reason: HOSPADM

## 2025-04-10 RX ORDER — ATORVASTATIN CALCIUM 80 MG/1
80 TABLET, FILM COATED ORAL DAILY
Status: DISCONTINUED | OUTPATIENT
Start: 2025-04-11 | End: 2025-04-10

## 2025-04-10 RX ORDER — TRAZODONE HYDROCHLORIDE 50 MG/1
50 TABLET ORAL NIGHTLY PRN
Status: DISCONTINUED | OUTPATIENT
Start: 2025-04-10 | End: 2025-04-11 | Stop reason: HOSPADM

## 2025-04-10 RX ORDER — ONDANSETRON HYDROCHLORIDE 2 MG/ML
4 INJECTION, SOLUTION INTRAVENOUS EVERY 6 HOURS PRN
Status: DISCONTINUED | OUTPATIENT
Start: 2025-04-10 | End: 2025-04-11 | Stop reason: HOSPADM

## 2025-04-10 RX ORDER — POLYETHYLENE GLYCOL 3350 17 G/17G
17 POWDER, FOR SOLUTION ORAL DAILY PRN
Status: DISCONTINUED | OUTPATIENT
Start: 2025-04-10 | End: 2025-04-11 | Stop reason: HOSPADM

## 2025-04-10 RX ORDER — NAPROXEN SODIUM 220 MG/1
324 TABLET, FILM COATED ORAL ONCE
Status: COMPLETED | OUTPATIENT
Start: 2025-04-10 | End: 2025-04-10

## 2025-04-10 RX ORDER — ACETAMINOPHEN 325 MG/1
650 TABLET ORAL EVERY 4 HOURS PRN
Status: DISCONTINUED | OUTPATIENT
Start: 2025-04-10 | End: 2025-04-11 | Stop reason: HOSPADM

## 2025-04-10 RX ORDER — LISINOPRIL 10 MG/1
10 TABLET ORAL DAILY
Status: DISCONTINUED | OUTPATIENT
Start: 2025-04-11 | End: 2025-04-10

## 2025-04-10 RX ADMIN — ASPIRIN 324 MG: 81 TABLET, CHEWABLE ORAL at 18:35

## 2025-04-10 RX ADMIN — ATORVASTATIN CALCIUM 80 MG: 80 TABLET, FILM COATED ORAL at 20:33

## 2025-04-10 RX ADMIN — ENOXAPARIN SODIUM 40 MG: 40 INJECTION SUBCUTANEOUS at 20:01

## 2025-04-10 SDOH — SOCIAL STABILITY: SOCIAL INSECURITY: ARE YOU MARRIED, WIDOWED, DIVORCED, SEPARATED, NEVER MARRIED, OR LIVING WITH A PARTNER?: MARRIED

## 2025-04-10 SDOH — SOCIAL STABILITY: SOCIAL INSECURITY: WERE YOU ABLE TO COMPLETE ALL THE BEHAVIORAL HEALTH SCREENINGS?: YES

## 2025-04-10 SDOH — SOCIAL STABILITY: SOCIAL INSECURITY: DOES ANYONE TRY TO KEEP YOU FROM HAVING/CONTACTING OTHER FRIENDS OR DOING THINGS OUTSIDE YOUR HOME?: NO

## 2025-04-10 SDOH — SOCIAL STABILITY: SOCIAL NETWORK: HOW OFTEN DO YOU ATTEND CHURCH OR RELIGIOUS SERVICES?: NEVER

## 2025-04-10 SDOH — SOCIAL STABILITY: SOCIAL NETWORK
DO YOU BELONG TO ANY CLUBS OR ORGANIZATIONS SUCH AS CHURCH GROUPS, UNIONS, FRATERNAL OR ATHLETIC GROUPS, OR SCHOOL GROUPS?: NO

## 2025-04-10 SDOH — SOCIAL STABILITY: SOCIAL INSECURITY
WITHIN THE LAST YEAR, HAVE YOU BEEN RAPED OR FORCED TO HAVE ANY KIND OF SEXUAL ACTIVITY BY YOUR PARTNER OR EX-PARTNER?: NO

## 2025-04-10 SDOH — ECONOMIC STABILITY: FOOD INSECURITY: HOW HARD IS IT FOR YOU TO PAY FOR THE VERY BASICS LIKE FOOD, HOUSING, MEDICAL CARE, AND HEATING?: NOT HARD AT ALL

## 2025-04-10 SDOH — SOCIAL STABILITY: SOCIAL INSECURITY: WITHIN THE LAST YEAR, HAVE YOU BEEN AFRAID OF YOUR PARTNER OR EX-PARTNER?: NO

## 2025-04-10 SDOH — ECONOMIC STABILITY: HOUSING INSECURITY: IN THE PAST 12 MONTHS, HOW MANY TIMES HAVE YOU MOVED WHERE YOU WERE LIVING?: 0

## 2025-04-10 SDOH — SOCIAL STABILITY: SOCIAL NETWORK: HOW OFTEN DO YOU ATTEND MEETINGS OF THE CLUBS OR ORGANIZATIONS YOU BELONG TO?: NEVER

## 2025-04-10 SDOH — HEALTH STABILITY: PHYSICAL HEALTH
HOW OFTEN DO YOU NEED TO HAVE SOMEONE HELP YOU WHEN YOU READ INSTRUCTIONS, PAMPHLETS, OR OTHER WRITTEN MATERIAL FROM YOUR DOCTOR OR PHARMACY?: NEVER

## 2025-04-10 SDOH — HEALTH STABILITY: MENTAL HEALTH
DO YOU FEEL STRESS - TENSE, RESTLESS, NERVOUS, OR ANXIOUS, OR UNABLE TO SLEEP AT NIGHT BECAUSE YOUR MIND IS TROUBLED ALL THE TIME - THESE DAYS?: NOT AT ALL

## 2025-04-10 SDOH — ECONOMIC STABILITY: FOOD INSECURITY: WITHIN THE PAST 12 MONTHS, YOU WORRIED THAT YOUR FOOD WOULD RUN OUT BEFORE YOU GOT THE MONEY TO BUY MORE.: NEVER TRUE

## 2025-04-10 SDOH — ECONOMIC STABILITY: TRANSPORTATION INSECURITY: IN THE PAST 12 MONTHS, HAS LACK OF TRANSPORTATION KEPT YOU FROM MEDICAL APPOINTMENTS OR FROM GETTING MEDICATIONS?: NO

## 2025-04-10 SDOH — SOCIAL STABILITY: SOCIAL INSECURITY
WITHIN THE LAST YEAR, HAVE YOU BEEN KICKED, HIT, SLAPPED, OR OTHERWISE PHYSICALLY HURT BY YOUR PARTNER OR EX-PARTNER?: NO

## 2025-04-10 SDOH — SOCIAL STABILITY: SOCIAL NETWORK: HOW OFTEN DO YOU GET TOGETHER WITH FRIENDS OR RELATIVES?: TWICE A WEEK

## 2025-04-10 SDOH — SOCIAL STABILITY: SOCIAL INSECURITY: WITHIN THE LAST YEAR, HAVE YOU BEEN HUMILIATED OR EMOTIONALLY ABUSED IN OTHER WAYS BY YOUR PARTNER OR EX-PARTNER?: NO

## 2025-04-10 SDOH — ECONOMIC STABILITY: INCOME INSECURITY: IN THE PAST 12 MONTHS HAS THE ELECTRIC, GAS, OIL, OR WATER COMPANY THREATENED TO SHUT OFF SERVICES IN YOUR HOME?: NO

## 2025-04-10 SDOH — SOCIAL STABILITY: SOCIAL INSECURITY: ARE THERE ANY APPARENT SIGNS OF INJURIES/BEHAVIORS THAT COULD BE RELATED TO ABUSE/NEGLECT?: NO

## 2025-04-10 SDOH — SOCIAL STABILITY: SOCIAL INSECURITY: DO YOU FEEL UNSAFE GOING BACK TO THE PLACE WHERE YOU ARE LIVING?: NO

## 2025-04-10 SDOH — SOCIAL STABILITY: SOCIAL INSECURITY: DO YOU FEEL ANYONE HAS EXPLOITED OR TAKEN ADVANTAGE OF YOU FINANCIALLY OR OF YOUR PERSONAL PROPERTY?: NO

## 2025-04-10 SDOH — ECONOMIC STABILITY: HOUSING INSECURITY: AT ANY TIME IN THE PAST 12 MONTHS, WERE YOU HOMELESS OR LIVING IN A SHELTER (INCLUDING NOW)?: NO

## 2025-04-10 SDOH — SOCIAL STABILITY: SOCIAL NETWORK: IN A TYPICAL WEEK, HOW MANY TIMES DO YOU TALK ON THE PHONE WITH FAMILY, FRIENDS, OR NEIGHBORS?: ONCE A WEEK

## 2025-04-10 SDOH — ECONOMIC STABILITY: HOUSING INSECURITY: IN THE LAST 12 MONTHS, WAS THERE A TIME WHEN YOU WERE NOT ABLE TO PAY THE MORTGAGE OR RENT ON TIME?: NO

## 2025-04-10 SDOH — ECONOMIC STABILITY: FOOD INSECURITY: WITHIN THE PAST 12 MONTHS, THE FOOD YOU BOUGHT JUST DIDN'T LAST AND YOU DIDN'T HAVE MONEY TO GET MORE.: NEVER TRUE

## 2025-04-10 SDOH — SOCIAL STABILITY: SOCIAL INSECURITY: ARE YOU OR HAVE YOU BEEN THREATENED OR ABUSED PHYSICALLY, EMOTIONALLY, OR SEXUALLY BY ANYONE?: NO

## 2025-04-10 SDOH — HEALTH STABILITY: PHYSICAL HEALTH: ON AVERAGE, HOW MANY DAYS PER WEEK DO YOU ENGAGE IN MODERATE TO STRENUOUS EXERCISE (LIKE A BRISK WALK)?: 0 DAYS

## 2025-04-10 SDOH — SOCIAL STABILITY: SOCIAL INSECURITY: HAVE YOU HAD ANY THOUGHTS OF HARMING ANYONE ELSE?: NO

## 2025-04-10 SDOH — SOCIAL STABILITY: SOCIAL INSECURITY: HAVE YOU HAD THOUGHTS OF HARMING ANYONE ELSE?: NO

## 2025-04-10 SDOH — HEALTH STABILITY: PHYSICAL HEALTH: ON AVERAGE, HOW MANY MINUTES DO YOU ENGAGE IN EXERCISE AT THIS LEVEL?: 0 MIN

## 2025-04-10 SDOH — SOCIAL STABILITY: SOCIAL INSECURITY: HAS ANYONE EVER THREATENED TO HURT YOUR FAMILY OR YOUR PETS?: NO

## 2025-04-10 SDOH — SOCIAL STABILITY: SOCIAL INSECURITY: ABUSE: ADULT

## 2025-04-10 SDOH — HEALTH STABILITY: MENTAL HEALTH: EXPERIENCED ANY OF THE FOLLOWING LIFE EVENTS: DEATH OF FAMILY/FRIEND;TRANSPORTATION ACCIDENT

## 2025-04-10 ASSESSMENT — ACTIVITIES OF DAILY LIVING (ADL)
ADEQUATE_TO_COMPLETE_ADL: YES
DRESSING YOURSELF: INDEPENDENT
PATIENT'S MEMORY ADEQUATE TO SAFELY COMPLETE DAILY ACTIVITIES?: YES
WALKS IN HOME: INDEPENDENT
HEARING - LEFT EAR: FUNCTIONAL
LACK_OF_TRANSPORTATION: NO
JUDGMENT_ADEQUATE_SAFELY_COMPLETE_DAILY_ACTIVITIES: YES
GROOMING: INDEPENDENT
HEARING - LEFT EAR: FUNCTIONAL
FEEDING YOURSELF: INDEPENDENT
JUDGMENT_ADEQUATE_SAFELY_COMPLETE_DAILY_ACTIVITIES: YES
BATHING: INDEPENDENT
HEARING - RIGHT EAR: FUNCTIONAL
HEARING - RIGHT EAR: FUNCTIONAL
DRESSING YOURSELF: INDEPENDENT
ADEQUATE_TO_COMPLETE_ADL: YES
GROOMING: INDEPENDENT
WALKS IN HOME: INDEPENDENT
BATHING: INDEPENDENT
TOILETING: INDEPENDENT
TOILETING: INDEPENDENT
PATIENT'S MEMORY ADEQUATE TO SAFELY COMPLETE DAILY ACTIVITIES?: YES
FEEDING YOURSELF: INDEPENDENT

## 2025-04-10 ASSESSMENT — ENCOUNTER SYMPTOMS
PALPITATIONS: 0
SHORTNESS OF BREATH: 0
MUSCULOSKELETAL NEGATIVE: 1
NEUROLOGICAL NEGATIVE: 1
COUGH: 0
PSYCHIATRIC NEGATIVE: 1
GASTROINTESTINAL NEGATIVE: 1
DIAPHORESIS: 0

## 2025-04-10 ASSESSMENT — COGNITIVE AND FUNCTIONAL STATUS - GENERAL
DAILY ACTIVITIY SCORE: 24
PATIENT BASELINE BEDBOUND: NO
MOBILITY SCORE: 24

## 2025-04-10 ASSESSMENT — COLUMBIA-SUICIDE SEVERITY RATING SCALE - C-SSRS
6. HAVE YOU EVER DONE ANYTHING, STARTED TO DO ANYTHING, OR PREPARED TO DO ANYTHING TO END YOUR LIFE?: NO
1. IN THE PAST MONTH, HAVE YOU WISHED YOU WERE DEAD OR WISHED YOU COULD GO TO SLEEP AND NOT WAKE UP?: NO
2. HAVE YOU ACTUALLY HAD ANY THOUGHTS OF KILLING YOURSELF?: NO

## 2025-04-10 ASSESSMENT — PAIN SCALES - GENERAL
PAINLEVEL_OUTOF10: 5 - MODERATE PAIN
PAINLEVEL_OUTOF10: 2
PAINLEVEL_OUTOF10: 5 - MODERATE PAIN
PAINLEVEL_OUTOF10: 5 - MODERATE PAIN
PAINLEVEL_OUTOF10: 0 - NO PAIN
PAINLEVEL_OUTOF10: 2

## 2025-04-10 ASSESSMENT — PAIN DESCRIPTION - DESCRIPTORS
DESCRIPTORS: ACHING
DESCRIPTORS: ACHING

## 2025-04-10 ASSESSMENT — PATIENT HEALTH QUESTIONNAIRE - PHQ9
SUM OF ALL RESPONSES TO PHQ9 QUESTIONS 1 & 2: 0
2. FEELING DOWN, DEPRESSED OR HOPELESS: NOT AT ALL
1. LITTLE INTEREST OR PLEASURE IN DOING THINGS: NOT AT ALL

## 2025-04-10 ASSESSMENT — PAIN DESCRIPTION - LOCATION: LOCATION: CHEST

## 2025-04-10 ASSESSMENT — PAIN - FUNCTIONAL ASSESSMENT: PAIN_FUNCTIONAL_ASSESSMENT: 0-10

## 2025-04-10 ASSESSMENT — LIFESTYLE VARIABLES
AUDIT-C TOTAL SCORE: 2
SUBSTANCE_ABUSE_PAST_12_MONTHS: NO
AUDIT-C TOTAL SCORE: 2
HOW OFTEN DO YOU HAVE A DRINK CONTAINING ALCOHOL: 2-4 TIMES A MONTH
HOW MANY STANDARD DRINKS CONTAINING ALCOHOL DO YOU HAVE ON A TYPICAL DAY: 1 OR 2
PRESCIPTION_ABUSE_PAST_12_MONTHS: NO
SKIP TO QUESTIONS 9-10: 1
HOW OFTEN DO YOU HAVE 6 OR MORE DRINKS ON ONE OCCASION: NEVER

## 2025-04-10 ASSESSMENT — PAIN DESCRIPTION - ORIENTATION: ORIENTATION: LEFT

## 2025-04-10 NOTE — TELEPHONE ENCOUNTER
"Patient left a voice message at 1:15pm regarding symptoms he has had and currently having.    I called Kit back, he tells a few days ago he had pain on the left side of his ribs.  It was on and off, then felt ok.  He sleeps on his left side and thought that was the cause.    Yesterday (4/9/25) and today, Kit's pain is in his left shoulder and radiates down his left arm.  He describes it as discomfort / ache.  Also, Kit has had GI issues \"heart burn\" and a headache in the temple area for the past 2 days.    I told Kit and his wife that he needs to go to the ER / call 911.  They both demonstrated a good understanding.  Kit states he will have his son drive him to [place] [facility] ER, I reminded him 911 would be safest.    JASPALI to Tracy Schwab, GET  ---ssd.  "

## 2025-04-10 NOTE — H&P
History Of Present Illness  Sidney Zapata is a 85 y.o. male with history of CAD s/p PCI & stenting of RCA , OM1 & D1 (01/2008), HTN, DLD, COPD, Ashton's Esophagus that presents to [facility] ER from home with chief complaint of left chest, shoulder and neck pain. Started ongoing intermittently for past 4 days. Rates 5/10 in severity at times. Last just a few minutes when it does come on. He states his pain is on the left neck, shoulder and beneath his left rib cage. He states he sleeps in his left side due to his hiatal hernia. He also indicates that he lifts a wheelchair in and out of his car for his wife daily, however, the pain is not reproducible. He cannot remember his last cardiac event as it was in 2008. He states the chest discomfort, neck and shoulder discomfort came back this morning when he woke up so he came to get evaluated.  He complains of chills, indigestion, nausea, one episode of vomiting. He denies any shortness of breath, abdominal pain. Due to heart score, ER requesting admission to observation. He was seen in the ED eating dinner, denies any current symptoms.      Past Medical History  Anxiety  Arteriosclerosis of coronary artery  Astigmatism of right eye  Astigmatism of both eyes  Ashton's esophagus  Benign enlargement of prostate  Bilateral presbyopia  CAD S/P percutaneous coronary angioplasty  Cataract  Cataract, nuclear sclerotic, left eye  Conjunctivitis  Depression  Drusen of right macula  Dyslipidemia  Emphysema/COPD (Multi)  Eye discharge  Fatigue  Esophageal reflux  Hiatal hernia  Hyperopia of both eyes with astigmatism and presbyopia  Hypertension  Hypertropia of left eye  Insomnia  Left knee pain  Major depressive disorder, recurrent, moderate (Multi)  Mid back pain  Patellar bursitis of left knee  Primary osteoarthritis of left knee  Pseudophakia of both eyes  Pulmonary nodule seen on imaging study  Rib pain  Secondary iritis of right eye  Skin  rash  Ulcerative colitis (Multi)  Viral syndrome  Upper respiratory tract infection  Glaucoma suspect of both eyes  PVD (posterior vitreous detachment), both eyes  Bilateral hearing loss  Bilateral low back pain with bilateral sciatica  Psoriasis with arthropathy (Multi)  Generalized muscle weakness  Unsteadiness on feet  First degree atrioventricular block    Surgical History  He has a past surgical history that includes Other surgical history (11/28/2012); Other surgical history (11/28/2012); Colonoscopy (11/28/2012); Other surgical history (11/28/2012); Cataract extraction (01/14/2015); Tonsillectomy (10/24/2013); and Other surgical history (10/24/2013).     Social History  He reports that he quit smoking about 50 years ago. His smoking use included cigarettes. He has never been exposed to tobacco smoke. He has never used smokeless tobacco. He reports current alcohol use. He reports that he does not use drugs.    Family History  Family History   Problem Relation Name Age of Onset    Other (cardiac disorder) Father      Hypertension Other          Allergies  Prevnar 13 (pf) [pneumoc 13-drea conj-dip cr(pf)] and Penicillins    Review of Systems   Constitutional:  Negative for diaphoresis.   HENT: Negative.     Respiratory:  Negative for cough and shortness of breath.    Cardiovascular:  Positive for chest pain. Negative for palpitations and leg swelling.   Gastrointestinal: Negative.    Genitourinary: Negative.    Musculoskeletal: Negative.    Skin: Negative.    Neurological: Negative.    Psychiatric/Behavioral: Negative.        ROS: 12 systems reviewed and negative except per HPI above     Physical Exam by System:     Constitutional: Well developed, awake/alert/oriented x3, no distress, alert and cooperative   ENMT: mucous membranes moist   Head/Neck: Neck supple   Respiratory/Thorax: Patent airways, CTAB, normal breath sounds with good chest expansion, thorax symmetric   Cardiovascular: Regular, rate and rhythm,  "no murmurs, 2+ equal pulses of the extremities, normal S 1and S 2   Gastrointestinal: Nondistended, soft, non-tender, no rebound tenderness or guarding, no masses palpable, no organomegaly, +BS, no bruits   Musculoskeletal: ROM intact, no joint swelling, normal strength   Extremities: normal extremities, no cyanosis edema, contusions or wounds, no clubbing   Neurological: alert and oriented x3, intact senses, motor, response and reflexes, normal strength       Last Recorded Vitals  Blood pressure 167/86, pulse 66, temperature 36.5 °C (97.7 °F), temperature source Temporal, resp. rate (!) 26, height 1.905 m (6' 3\"), weight 81.2 kg (179 lb), SpO2 96%.    Relevant Results  Results for orders placed or performed during the hospital encounter of 04/10/25 (from the past 24 hours)   CBC and Auto Differential   Result Value Ref Range    WBC 7.8 4.4 - 11.3 x10*3/uL    nRBC 0.0 0.0 - 0.0 /100 WBCs    RBC 4.17 (L) 4.50 - 5.90 x10*6/uL    Hemoglobin 12.9 (L) 13.5 - 17.5 g/dL    Hematocrit 40.5 (L) 41.0 - 52.0 %    MCV 97 80 - 100 fL    MCH 30.9 26.0 - 34.0 pg    MCHC 31.9 (L) 32.0 - 36.0 g/dL    RDW 12.6 11.5 - 14.5 %    Platelets 183 150 - 450 x10*3/uL    Neutrophils % 69.6 40.0 - 80.0 %    Immature Granulocytes %, Automated 0.3 0.0 - 0.9 %    Lymphocytes % 19.5 13.0 - 44.0 %    Monocytes % 6.9 2.0 - 10.0 %    Eosinophils % 3.3 0.0 - 6.0 %    Basophils % 0.4 0.0 - 2.0 %    Neutrophils Absolute 5.43 1.60 - 5.50 x10*3/uL    Immature Granulocytes Absolute, Automated 0.02 0.00 - 0.50 x10*3/uL    Lymphocytes Absolute 1.52 0.80 - 3.00 x10*3/uL    Monocytes Absolute 0.54 0.05 - 0.80 x10*3/uL    Eosinophils Absolute 0.26 0.00 - 0.40 x10*3/uL    Basophils Absolute 0.03 0.00 - 0.10 x10*3/uL   Magnesium   Result Value Ref Range    Magnesium 1.81 1.60 - 2.40 mg/dL   Comprehensive metabolic panel   Result Value Ref Range    Glucose 136 (H) 74 - 99 mg/dL    Sodium 137 136 - 145 mmol/L    Potassium 4.1 3.5 - 5.3 mmol/L    Chloride 104 98 - " 107 mmol/L    Bicarbonate 27 21 - 32 mmol/L    Anion Gap 10 10 - 20 mmol/L    Urea Nitrogen 21 6 - 23 mg/dL    Creatinine 1.07 0.50 - 1.30 mg/dL    eGFR 68 >60 mL/min/1.73m*2    Calcium 8.9 8.6 - 10.3 mg/dL    Albumin 4.3 3.4 - 5.0 g/dL    Alkaline Phosphatase 69 33 - 136 U/L    Total Protein 6.9 6.4 - 8.2 g/dL    AST 19 9 - 39 U/L    Bilirubin, Total 0.6 0.0 - 1.2 mg/dL    ALT 18 10 - 52 U/L   Troponin I, High Sensitivity   Result Value Ref Range    Troponin I, High Sensitivity 6 0 - 20 ng/L   B-Type Natriuretic Peptide   Result Value Ref Range    BNP 88 0 - 99 pg/mL   D-Dimer, VTE Exclusion   Result Value Ref Range    D-Dimer, Quantitative VTE Exclusion 480 <=500 ng/mL FEU      Scheduled medications    Continuous medications    PRN medications       Imaging  XR chest 1 view    Result Date: 4/10/2025  Small left pleural effusion with mild left basilar pneumonia/atelectasis.   Mild cardiomegaly. Left-sided coronary artery calcifications.   Intrathoracic stomach. Prominent gaseous distention of the herniated stomach. Cannot exclude gastric outlet obstruction. Clinical correlation needed.   MACRO: None   Signed by: Brandon Whiteside 4/10/2025 3:18 PM Dictation workstation:   [id number]     Cardiology, Vascular, and Other Imaging  No other imaging results found for the past 2 days       Assessment/Plan     Chest pain r/o acs  History of hypertension  History of CAD  History of hyperlipidemia    Plan:    -Admit to observation, acute care with tele  -Troponin 6, second troponin pending, BNP 88  -D dimer 480  -Cxr-Small left pleural effusion with mild left basilar  pneumonia/atelectasis. Intrathoracic stomach. Prominent gaseous distention of the herniated  stomach. Cannot exclude gastric outlet obstruction.  -protonix daily  -resume patient daily 81 mg aspirin  -resume patient atorvastatin 80 mg daily  -resume patient medications including lisinopril, Proscar, Flomax, atenolol, Lexapro  -cardiac diet  -npo after midnight  pending cardiology evaluation  -am labs including bmp, mag  -dvtp: Lovenox  -POC discussed with patient and attending  -dispo: pending clinical course and consultant recommendations, likely require less than 2 midnight stays to adequately treat and safe dc plan    Code status: Full Code    I spent >50 minutes in the professional and overall care of this patient.    SIGNATURE: NA Maynard-GET PATIENT NAME: Sidney Zapata   DATE: April 10, 2025 MRN: [id number]   TIME: 5:32 PM

## 2025-04-10 NOTE — ED PROVIDER NOTES
Emergency Department Provider Note        History of Present Illness     History provided by: Patient  Limitations to History: None  External Records Reviewed with Brief Summary: None    HPI:  Sidney Zapata is a 85 y.o. male with a history of CAD, coronary angioplasty with 5 stent placement about 20 years ago, HTN, hiatal hernia, GERD, and first-degree block, who presents to the ED with complaint of chest pain and neck pain.  Patient stated that he symptoms going on for 4 days.  Stated pain comes and goes, and last for less than 5 pain.  Not sure what makes it worse, but resting makes it better. He woke up today with a left-sided chest pain that radiates to his left hand, and subsequently into his neck.  He rates the pain a 5/10 in severity.  Describes the pain as dull aching pain.  Reports chills, night sweats, nausea, indigestion, and one episode of vomiting.  Denies headache, dizziness, lightheadedness, fever, cough, shortness of breath, abdominal pain, or recent travels.  No associated back pain.    Physical Exam   Triage vitals:  T 36.5 °C (97.7 °F)  HR 76  /89  RR 16  O2 98 % None (Room air)    General: Awake, alert, in no acute distress  Eyes: Gaze conjugate.  No scleral icterus or injection  HENT: Normo-cephalic, atraumatic. No stridor  CV: Regular rate, regular rhythm. Radial pulses 2+ bilaterally  Resp: Breathing non-labored, speaking in full sentences.  Clear to auscultation bilaterally  GI: Soft, non-distended, non-tender. No rebound or guarding.  : Not examined.  MSK/Extremities: No gross bony deformities. Moving all extremities  Skin: Warm. Appropriate color  Neuro: Alert. Oriented. Face symmetric. Speech is fluent.  Gross strength and sensation intact in b/l UE and LEs  Psych: Appropriate mood and affect    Medical Decision Making & ED Course   Medical Decision Makin y.o. male with a history of CAD, coronary angioplasty with 5 stent placement about 20 years ago, HTN, hiatal hernia,  GERD, and first-degree block, who presents to the ED with complaint of chest pain and neck pain.  On assessment, patient hemodynamically stable, well-appearing, nontoxic, not in apparent distress.  On exam, patient presents with left lateral chest wall discomfort which she rates as a 5/10 in severity.  Given his medical history and clinical presentation, patient is considered to be at risk of ACS, PE, or pneumonia    Plan is to obtain cardiac workup labs including CBC, CMP, cardiac enzyme, and D-dimer to rule out anemia, leukocytosis, electrolyte abnormality, cardiac event, and risk of pulmonary embolism.    We ordered EKG and checks x-ray to rule out acute cardiopulmonary pathology.    In the meantime labs are still pending. Will reassess patient.  In the meantime, no indication for medical intervention.    EKG    The case was discussed with the ED attending, Dr. Godinez, who saw and evaluated patient at bedside.      ----  Scoring Tools Utilized:   6    Differential diagnoses considered include but are not limited to: ACS, PE, pneumonia,     Social Determinants of Health which Significantly Impact Care: None identified {The following actions were taken to address these social determinants:[id number]}    EKG Independent Interpretation: EKG interpreted by myself. Please see ED Course for full interpretation.    Independent Result Review and Interpretation: Relevant laboratory and radiographic results were reviewed and independently interpreted by myself.  As necessary, they are commented on in the ED Course.    Chronic conditions affecting the patient's care: As documented above in [facility]    The patient was discussed with the following consultants/services:  ***    Care Considerations: As documented above in [facility]    ED Course:  Diagnoses as of 04/10/25 1710   Chest pain, unspecified type   Nausea   Coronary artery disease involving native heart, unspecified vessel or lesion type, unspecified whether angina present      Disposition   As a result of their workup, the patient will require admission to the hospital.  The patient was informed of his diagnosis.  The patient was given the opportunity to ask questions and I answered them. The patient agreed to be admitted to the hospital.    Procedures   Procedures    This was a shared visit with an ED attending.  The patient was seen and discussed with the ED attending Dr. Godinez.    Ashley Galeano MD  Emergency Medicine   DO Herbert  04/15/25 1404

## 2025-04-10 NOTE — ED TRIAGE NOTES
Pt reports chest pain, left sided neck pain that radiates down his left arm x2-3 days. -SOB. Pt reports cardiac history and 5 cardiac stents. Pt reports 5/10 pain currently.

## 2025-04-11 ENCOUNTER — APPOINTMENT (OUTPATIENT)
Dept: CARDIOLOGY | Facility: HOSPITAL | Age: 85
End: 2025-04-11
Payer: MEDICARE

## 2025-04-11 VITALS
HEIGHT: 75 IN | WEIGHT: 180 LBS | HEART RATE: 64 BPM | DIASTOLIC BLOOD PRESSURE: 86 MMHG | BODY MASS INDEX: 22.38 KG/M2 | RESPIRATION RATE: 18 BRPM | TEMPERATURE: 99.1 F | OXYGEN SATURATION: 95 % | SYSTOLIC BLOOD PRESSURE: 159 MMHG

## 2025-04-11 LAB
ANION GAP SERPL CALC-SCNC: 11 MMOL/L (ref 10–20)
ATRIAL RATE: 65 BPM
BUN SERPL-MCNC: 19 MG/DL (ref 6–23)
CALCIUM SERPL-MCNC: 8.8 MG/DL (ref 8.6–10.3)
CHLORIDE SERPL-SCNC: 104 MMOL/L (ref 98–107)
CO2 SERPL-SCNC: 29 MMOL/L (ref 21–32)
CREAT SERPL-MCNC: 1.01 MG/DL (ref 0.5–1.3)
EGFRCR SERPLBLD CKD-EPI 2021: 72 ML/MIN/1.73M*2
EJECTION FRACTION APICAL 4 CHAMBER: 62.4
EJECTION FRACTION: 60 %
ERYTHROCYTE [DISTWIDTH] IN BLOOD BY AUTOMATED COUNT: 12.6 % (ref 11.5–14.5)
GLUCOSE SERPL-MCNC: 92 MG/DL (ref 74–99)
HCT VFR BLD AUTO: 36.9 % (ref 41–52)
HGB BLD-MCNC: 12.1 G/DL (ref 13.5–17.5)
LEFT VENTRICLE INTERNAL DIMENSION DIASTOLE: 4.1 CM (ref 3.5–6)
LEFT VENTRICULAR OUTFLOW TRACT DIAMETER: 1.9 CM
LV EJECTION FRACTION BIPLANE: 60 %
MAGNESIUM SERPL-MCNC: 1.77 MG/DL (ref 1.6–2.4)
MCH RBC QN AUTO: 31.1 PG (ref 26–34)
MCHC RBC AUTO-ENTMCNC: 32.8 G/DL (ref 32–36)
MCV RBC AUTO: 95 FL (ref 80–100)
MITRAL VALVE E/A RATIO: 0.83
NRBC BLD-RTO: 0 /100 WBCS (ref 0–0)
P AXIS: 68 DEGREES
P OFFSET: 153 MS
P ONSET: 85 MS
PHOSPHATE SERPL-MCNC: 2.8 MG/DL (ref 2.5–4.9)
PLATELET # BLD AUTO: 177 X10*3/UL (ref 150–450)
POTASSIUM SERPL-SCNC: 3.6 MMOL/L (ref 3.5–5.3)
PR INTERVAL: 262 MS
Q ONSET: 216 MS
QRS COUNT: 11 BEATS
QRS DURATION: 98 MS
QT INTERVAL: 428 MS
QTC CALCULATION(BAZETT): 445 MS
QTC FREDERICIA: 439 MS
R AXIS: 163 DEGREES
RBC # BLD AUTO: 3.89 X10*6/UL (ref 4.5–5.9)
SODIUM SERPL-SCNC: 140 MMOL/L (ref 136–145)
T AXIS: 80 DEGREES
T OFFSET: 430 MS
TRICUSPID ANNULAR PLANE SYSTOLIC EXCURSION: 2.7 CM
VENTRICULAR RATE: 65 BPM
WBC # BLD AUTO: 6.1 X10*3/UL (ref 4.4–11.3)

## 2025-04-11 PROCEDURE — 2500000004 HC RX 250 GENERAL PHARMACY W/ HCPCS (ALT 636 FOR OP/ED): Performed by: INTERNAL MEDICINE

## 2025-04-11 PROCEDURE — 84100 ASSAY OF PHOSPHORUS: CPT | Performed by: STUDENT IN AN ORGANIZED HEALTH CARE EDUCATION/TRAINING PROGRAM

## 2025-04-11 PROCEDURE — 85027 COMPLETE CBC AUTOMATED: CPT | Performed by: STUDENT IN AN ORGANIZED HEALTH CARE EDUCATION/TRAINING PROGRAM

## 2025-04-11 PROCEDURE — 93308 TTE F-UP OR LMTD: CPT | Performed by: INTERNAL MEDICINE

## 2025-04-11 PROCEDURE — 2500000001 HC RX 250 WO HCPCS SELF ADMINISTERED DRUGS (ALT 637 FOR MEDICARE OP): Performed by: INTERNAL MEDICINE

## 2025-04-11 PROCEDURE — 2500000002 HC RX 250 W HCPCS SELF ADMINISTERED DRUGS (ALT 637 FOR MEDICARE OP, ALT 636 FOR OP/ED): Performed by: INTERNAL MEDICINE

## 2025-04-11 PROCEDURE — 2500000002 HC RX 250 W HCPCS SELF ADMINISTERED DRUGS (ALT 637 FOR MEDICARE OP, ALT 636 FOR OP/ED): Performed by: NURSE PRACTITIONER

## 2025-04-11 PROCEDURE — 82374 ASSAY BLOOD CARBON DIOXIDE: CPT | Performed by: NURSE PRACTITIONER

## 2025-04-11 PROCEDURE — 36415 COLL VENOUS BLD VENIPUNCTURE: CPT | Performed by: NURSE PRACTITIONER

## 2025-04-11 PROCEDURE — G0378 HOSPITAL OBSERVATION PER HR: HCPCS

## 2025-04-11 PROCEDURE — 2500000001 HC RX 250 WO HCPCS SELF ADMINISTERED DRUGS (ALT 637 FOR MEDICARE OP): Performed by: NURSE PRACTITIONER

## 2025-04-11 PROCEDURE — 93308 TTE F-UP OR LMTD: CPT

## 2025-04-11 PROCEDURE — 96374 THER/PROPH/DIAG INJ IV PUSH: CPT | Mod: 59

## 2025-04-11 PROCEDURE — 99238 HOSP IP/OBS DSCHRG MGMT 30/<: CPT | Performed by: INTERNAL MEDICINE

## 2025-04-11 PROCEDURE — 2500000001 HC RX 250 WO HCPCS SELF ADMINISTERED DRUGS (ALT 637 FOR MEDICARE OP): Performed by: STUDENT IN AN ORGANIZED HEALTH CARE EDUCATION/TRAINING PROGRAM

## 2025-04-11 PROCEDURE — 83735 ASSAY OF MAGNESIUM: CPT | Performed by: NURSE PRACTITIONER

## 2025-04-11 RX ORDER — POTASSIUM CHLORIDE 20 MEQ/1
40 TABLET, EXTENDED RELEASE ORAL ONCE
Status: COMPLETED | OUTPATIENT
Start: 2025-04-11 | End: 2025-04-11

## 2025-04-11 RX ORDER — LANOLIN ALCOHOL/MO/W.PET/CERES
400 CREAM (GRAM) TOPICAL ONCE
Status: COMPLETED | OUTPATIENT
Start: 2025-04-11 | End: 2025-04-11

## 2025-04-11 RX ORDER — HYDRALAZINE HYDROCHLORIDE 20 MG/ML
10 INJECTION INTRAMUSCULAR; INTRAVENOUS ONCE
Status: COMPLETED | OUTPATIENT
Start: 2025-04-11 | End: 2025-04-11

## 2025-04-11 RX ADMIN — LISINOPRIL 30 MG: 20 TABLET ORAL at 08:33

## 2025-04-11 RX ADMIN — PANTOPRAZOLE SODIUM 40 MG: 40 TABLET, DELAYED RELEASE ORAL at 06:28

## 2025-04-11 RX ADMIN — ATENOLOL 25 MG: 25 TABLET ORAL at 06:52

## 2025-04-11 RX ADMIN — POTASSIUM CHLORIDE 40 MEQ: 1500 TABLET, EXTENDED RELEASE ORAL at 08:26

## 2025-04-11 RX ADMIN — HYDRALAZINE HYDROCHLORIDE 10 MG: 20 INJECTION INTRAMUSCULAR; INTRAVENOUS at 08:33

## 2025-04-11 RX ADMIN — LISINOPRIL 10 MG: 10 TABLET ORAL at 06:56

## 2025-04-11 RX ADMIN — TAMSULOSIN HYDROCHLORIDE 0.4 MG: 0.4 CAPSULE ORAL at 08:26

## 2025-04-11 RX ADMIN — FINASTERIDE 5 MG: 5 TABLET, FILM COATED ORAL at 08:26

## 2025-04-11 RX ADMIN — ESCITALOPRAM OXALATE 20 MG: 20 TABLET ORAL at 08:26

## 2025-04-11 RX ADMIN — Medication 400 MG: at 08:26

## 2025-04-11 RX ADMIN — ASPIRIN 81 MG: 81 TABLET, CHEWABLE ORAL at 08:26

## 2025-04-11 ASSESSMENT — PAIN SCALES - GENERAL: PAINLEVEL_OUTOF10: 0 - NO PAIN

## 2025-04-11 NOTE — CARE PLAN
The patient's goals for the shift include get some rest    The clinical goals for the shift include no falls    Problem: Pain - Adult  Goal: Verbalizes/displays adequate comfort level or baseline comfort level  Outcome: Progressing     Problem: Safety - Adult  Goal: Free from fall injury  Outcome: Progressing     Problem: Chronic Conditions and Co-morbidities  Goal: Patient's chronic conditions and co-morbidity symptoms are monitored and maintained or improved  Outcome: Progressing     Problem: Skin  Goal: Decreased wound size/increased tissue granulation at next dressing change  Outcome: Progressing  Goal: Participates in plan/prevention/treatment measures  Outcome: Progressing  Goal: Prevent/manage excess moisture  Outcome: Progressing  Goal: Prevent/minimize sheer/friction injuries  Outcome: Progressing  Goal: Promote/optimize nutrition  Outcome: Progressing  Goal: Promote skin healing  Outcome: Progressing     Problem: Pain  Goal: Takes deep breaths with improved pain control throughout the shift  Outcome: Progressing  Goal: Turns in bed with improved pain control throughout the shift  Outcome: Progressing  Goal: Walks with improved pain control throughout the shift  Outcome: Progressing  Goal: Performs ADL's with improved pain control throughout shift  Outcome: Progressing  Goal: Free from opioid side effects throughout the shift  Outcome: Progressing  Goal: Free from acute confusion related to pain meds throughout the shift  Outcome: Progressing     Problem: Respiratory  Goal: Clear secretions with interventions this shift  Outcome: Progressing  Goal: Minimize anxiety/maximize coping throughout shift  Outcome: Progressing  Goal: Minimal/no exertional discomfort or dyspnea this shift  Outcome: Progressing  Goal: No signs of respiratory distress (eg. Use of accessory muscles. Peds grunting)  Outcome: Progressing  Goal: Patent airway maintained this shift  Outcome: Progressing  Goal: Tolerate mechanical  ventilation evidenced by VS/agitation level this shift  Outcome: Progressing  Goal: Tolerate pulmonary toileting this shift  Outcome: Progressing  Goal: Verbalize decreased shortness of breath this shift  Outcome: Progressing

## 2025-04-11 NOTE — NURSING NOTE
PT. SLEPT AT SHORT INTERVALS. DENIES C/O OF CHEST PAIN OR DISCOMFORT ALL NIGHT. BP = WAS ELEVATED. AROUND 0415= / YUNIOR SUMNER MADE AWARE. SAID TO RECHECK IT AROUND 0700 AND IF IT REMAINS DAVID TO GIVE HIS BP MEDS EARLY.   PTS. BP QDS=461/94 W/ DINAMAP /90 , HR=60 MANUALLY,SO LISINOPRIL 10MG AND ATENOLOL 25MG WAS GIVEN PO W/ SIPS OF WATER.DR. GERARDO WALKER WAS MESSAGE RE: LATEST BP AND THAT ATENOLOL AND LISINOPRIL WAS GIVEN EARLY.ALSO JEOVANY SILVA /RN TAKEIN OVER WAS AWARE OF ABOVE.

## 2025-04-11 NOTE — DISCHARGE SUMMARY
Discharge Diagnosis  Chest pain    Issues Requiring Follow-Up  Follow up with cardiology to see if stress test is indicated    Discharge Meds     Medication List      CONTINUE taking these medications     aspirin 81 mg chewable tablet; Chew 1 tablet (81 mg) once daily.   atenolol 25 mg tablet; Commonly known as: Tenormin; TAKE 1 TABLET BY   MOUTH DAILY   atorvastatin 80 mg tablet; Commonly known as: Lipitor; TAKE 1 TABLET BY   MOUTH ONCE  DAILY   escitalopram 20 mg tablet; Commonly known as: Lexapro; TAKE 1 TABLET BY   MOUTH ONCE  DAILY   finasteride 5 mg tablet; Commonly known as: Proscar; TAKE 1 TABLET BY   MOUTH ONCE  DAILY   lisinopril 10 mg tablet; TAKE 1 TABLET BY MOUTH DAILY   omeprazole 20 mg DR capsule; Commonly known as: PriLOSEC; TAKE 1 CAPSULE   BY MOUTH ONCE  DAILY   tamsulosin 0.4 mg 24 hr capsule; Commonly known as: Flomax; TAKE 1   CAPSULE BY MOUTH ONCE  DAILY   traZODone 50 mg tablet; Commonly known as: Desyrel; TAKE 1 TO 2 TABLETS   BY MOUTH AT  BEDTIME AS NEEDED FOR INSOMNIA       Test Results Pending At Discharge  Pending Labs       No current pending labs.            Hospital Course   History Of Present Illness  Sidney Zapata is a 85 y.o. male with history of CAD s/p PCI & stenting of RCA , OM1 & D1 (01/2008), HTN, DLD, COPD, Ashton's Esophagus that presents to [facility] ER from home with chief complaint of left chest, shoulder and neck pain. Started ongoing intermittently for past 4 days. Rates 5/10 in severity at times. Last just a few minutes when it does come on. He states his pain is on the left neck, shoulder and beneath his left rib cage. He states he sleeps in his left side due to his hiatal hernia. He also indicates that he lifts a wheelchair in and out of his car for his wife daily, however, the pain is not reproducible. He cannot remember his last cardiac event as it was in 2008. He states the chest discomfort, neck and shoulder discomfort came back this  morning when he woke up so he came to get evaluated.  He complains of chills, indigestion, nausea, one episode of vomiting. He denies any shortness of breath, abdominal pain. Due to heart score, ER requesting admission to observation. He was seen in the ED eating dinner, denies any current symptoms.     Hospital course: Patient was observed in the hospital for chest pain in setting of having history of CAD status post PCI.  Patient was stable throughout hospitalization.  He had negative troponins.  His EKG was nonischemic.  2D echo was done which is not concerning for ischemia either.  Patient was seen by Dr. Haskins and cleared for discharge from a cardiology standpoint with plans for follow-up with his cardiology team for an outpatient stress test.  Patient was discharged home with no medication changes in stable condition.    Pertinent Physical Exam At Time of Discharge  Physical Exam  Constitutional:       General: He is not in acute distress.     Appearance: Normal appearance.   HENT:      Head: Normocephalic and atraumatic.      Mouth/Throat:      Mouth: Mucous membranes are moist.   Eyes:      Extraocular Movements: Extraocular movements intact.      Conjunctiva/sclera: Conjunctivae normal.   Cardiovascular:      Rate and Rhythm: Normal rate and regular rhythm.      Heart sounds: Normal heart sounds.   Pulmonary:      Effort: Pulmonary effort is normal.      Breath sounds: Normal breath sounds. No wheezing, rhonchi or rales.   Abdominal:      General: Abdomen is flat. Bowel sounds are normal.      Palpations: Abdomen is soft.   Musculoskeletal:         General: No swelling or tenderness. Normal range of motion.      Cervical back: Normal range of motion and neck supple.   Skin:     General: Skin is warm and dry.      Findings: No rash.   Neurological:      General: No focal deficit present.      Mental Status: He is alert and oriented to person, place, and time.      Cranial Nerves: No cranial nerve deficit.       Sensory: No sensory deficit.   Psychiatric:         Mood and Affect: Mood normal.         Behavior: Behavior normal.         Outpatient Follow-Up  Future Appointments   Date Time Provider Department Center   4/14/2025  2:30 PM Vijay Luis MD [id number] [facility]   6/13/2025  9:15 AM Kierra Williamson MD [id number] [facility]   8/12/2025  2:00 PM Alessia Taylor MD PhD [id number] [facility]   8/20/2025  1:15 PM Jovanni Perera MD [id number] [facility]   2/20/2026  9:00 AM Tracy M Schwab, APRN-CNP [id number] [facility]         Roberto Carlos Wang MD

## 2025-04-11 NOTE — CONSULTS
Subjective Data:  R07.9 chest pain atypical  I25.1 CAD s/p stenting in 2009  I10.0 HTN  J 44.9 copd    Echo stable  Outpatient followup and possible outpaitent stress, cleared for discharge    Overnight Events:    Elderly 86 1/2 year old with chest pain somewhat atypical repoducible. Known CAD s/p PCI stenting of RCA , OM1 and Diagonal 1 /20209. HTN, DMD, copd, Barretts esphgus. Presented from home with chest pain L sided shoulder and neck intermittent for 4 days. Stated pain was in L sided neck and L ribs. Of note, lifts a wheel chair in and out of his car for his wife daily. Due to known CAD and heart score kept for observation. Follows with Tracy Schwab APN. Echo today stable. Can be disharged with outpatient followup and possible outpatient stress tests pharmacologic        Echocardiogram:    CONCLUSIONS:   1. The left ventricular systolic function is normal, with a Maya's biplane calculated ejection fraction of 60%.   2. Spectral Doppler shows a Grade I (impaired relaxation pattern) of left ventricular diastolic filling with normal left atrial filling pressure.   3. There is aortic sclerosis with normal leaflet mobility.     QUANTITATIVE DATA SUMMARY:     2D MEASUREMENTS:             Normal Ranges:  LAs:             2.30 cm     (2.7-4.0cm)  IVSd:            1.10 cm     (0.6-1.1cm)  LVPWd:           1.00 cm     (0.6-1.1cm)  LVIDd:           4.10 cm     (3.9-5.9cm)  LVIDs:           3.00 cm  LV Mass Index:   67.6 g/m2  LVEDV Index:     29.66 ml/m2  LV % FS          26.8 %        LEFT ATRIUM:                Normal Ranges:  LA Area A4C:     11.2 cm2  LA Area A2C:     14.4 cm2  LA Volume Index: 12.0 ml/m2        M-MODE MEASUREMENTS:         Normal Ranges:  Ao Root:             3.40 cm (2.0-3.7cm)  AoV Exc:             1.90 cm (1.5-2.5cm)        AORTA MEASUREMENTS:         Normal Ranges:  AoV Exc:            1.90 cm (1.5-2.5cm)  Asc Ao, d:          2.70 cm (2.1-3.4cm)        LV SYSTOLIC FUNCTION:                        Normal Ranges:  EF-A4C View:    62 % (>=55%)  EF-A2C View:    57 %  EF-Biplane:     60 %  LV EF Reported: 60 %        LV DIASTOLIC FUNCTION:           Normal Ranges:  MV Peak E:             1.00 m/s  (0.7-1.2 m/s)  MV Peak A:             1.21 m/s  (0.42-0.7 m/s)  E/A Ratio:             0.83      (1.0-2.2)  MV e'                  0.041 m/s (>8.0)  MV lateral e'          0.04 m/s  MV medial e'           0.04 m/s  E/e' Ratio:            24.40     (<8.0)        MITRAL VALVE:          Normal Ranges:  MV DT:        290 msec (150-240msec)        AORTIC VALVE:          Normal Ranges:  LVOT Diameter: 1.90 cm (1.8-2.4cm)        RIGHT VENTRICLE:  RV Basal 3.30 cm  RV Mid   2.40 cm  RV Major 8.0 cm  TAPSE:   26.7 mm        [id number] Alessandro Barry MD  Electronically signed on 4/11/2025 at 2:01:09 PM             Past Medical History  Anxiety  Arteriosclerosis of coronary artery  Astigmatism of right eye  Astigmatism of both eyes  Ashton's esophagus  Benign enlargement of prostate  Bilateral presbyopia  CAD S/P percutaneous coronary angioplasty  Cataract  Cataract, nuclear sclerotic, left eye  Conjunctivitis  Depression  Drusen of right macula  Dyslipidemia  Emphysema/COPD (Multi)  Eye discharge  Fatigue  Esophageal reflux  Hiatal hernia  Hyperopia of both eyes with astigmatism and presbyopia  Hypertension  Hypertropia of left eye  Insomnia  Left knee pain  Major depressive disorder, recurrent, moderate (Multi)  Mid back pain  Patellar bursitis of left knee  Primary osteoarthritis of left knee  Pseudophakia of both eyes  Pulmonary nodule seen on imaging study  Rib pain  Secondary iritis of right eye  Skin rash  Ulcerative colitis (Multi)  Viral syndrome  Upper respiratory tract infection  Glaucoma suspect of both eyes  PVD (posterior vitreous detachment), both eyes  Bilateral hearing loss  Bilateral low back pain with bilateral sciatica  Psoriasis with arthropathy (Multi)  Generalized muscle weakness  Unsteadiness on  feet  First degree atrioventricular block     Surgical History  He has a past surgical history that includes Other surgical history (11/28/2012); Other surgical history (11/28/2012); Colonoscopy (11/28/2012); Other surgical history (11/28/2012); Cataract extraction (01/14/2015); Tonsillectomy (10/24/2013); and Other surgical history (10/24/2013).     Social History  He reports that he quit smoking about 50 years ago. His smoking use included cigarettes. He has never been exposed to tobacco smoke. He has never used smokeless tobacco. He reports current alcohol use. He reports that he does not use drugs.     Family History  Family History          Family History   Problem Relation Name Age of Onset    Other (cardiac disorder) Father        Hypertension Other                Allergies  Prevnar 13 (pf) [pneumoc 13-drea conj-dip cr(pf)] and Penicillins     Objective Data:  Last Recorded Vitals:  Vitals:    04/11/25 0655 04/11/25 0821 04/11/25 0914 04/11/25 1200   BP: (!) 190/90 (!) 208/98 174/85 174/82   BP Location: Right arm      Patient Position: Lying      Pulse:  60  64   Resp:  17  17   Temp:  36.8 °C (98.2 °F)  36.9 °C (98.4 °F)   TempSrc:       SpO2:  96%  97%   Weight:       Height:           Last Labs:  CBC - 4/11/2025:  5:49 AM  6.1 12.1 177    36.9      CMP - 4/11/2025:  5:49 AM  8.8 6.9 19 --- 0.6   2.8 4.3 18 69      PTT - No results in last year.  _   _ _     TROPHS   Date/Time Value Ref Range Status   04/10/2025 05:48 PM 6 0 - 20 ng/L Final   04/10/2025 02:57 PM 6 0 - 20 ng/L Final     BNP   Date/Time Value Ref Range Status   04/10/2025 02:57 PM 88 0 - 99 pg/mL Final     LDLCALC   Date/Time Value Ref Range Status   02/24/2025 08:39 AM 64 mg/dL (calc) Final     Comment:     Reference range: <100     Desirable range <100 mg/dL for primary prevention;    <70 mg/dL for patients with CHD or diabetic patients   with > or = 2 CHD risk factors.     LDL-C is now calculated using the Juan-Pfeiffer   calculation,  which is a validated novel method providing   better accuracy than the Friedewald equation in the   estimation of LDL-C.   Juan SS et al. BISHOP. 2013;310(19): 5210-2151   (http://GOBA.Buddytruk/faq/BQL478)     02/28/2024 07:39 AM 47 <=99 mg/dL Final     Comment:                                 Near   Borderline      AGE      Desirable  Optimal    High     High     Very High     0-19 Y     0 - 109     ---    110-129   >/= 130     ----    20-24 Y     0 - 119     ---    120-159   >/= 160     ----      >24 Y     0 -  99   100-129  130-159   160-189     >/=190       VLDL   Date/Time Value Ref Range Status   02/28/2024 07:39 AM 14 0 - 40 mg/dL Final   10/05/2022 09:51 AM 11 0 - 40 mg/dL Final   08/25/2021 09:22 AM 10 0 - 40 mg/dL Final   08/19/2020 09:19 AM 10 0 - 40 mg/dL Final      Last I/O:  I/O last 3 completed shifts:  In: 160 (2 mL/kg) [P.O.:160]  Out: - (0 mL/kg)   Weight: 81.6 kg     Past Cardiology Tests (Last 3 Years):  EKG:  ECG 12 lead 04/10/2025 (Preliminary)      ECG 12 lead (Clinic Performed) 02/20/2025      ECG 12 Lead 02/20/2025      ECG 12 lead (Clinic Performed) 02/15/2024    Echo:  Transthoracic Echo (TTE) Limited 04/11/2025      Transthoracic Echo Complete 03/28/2024      Transthoracic Echo Complete     Ejection Fractions:  EF   Date/Time Value Ref Range Status   04/11/2025 11:55 AM 60 %    03/28/2024 09:28 AM 59 %      Cath:  No results found for this or any previous visit from the past 1095 days.    Stress Test:  No results found for this or any previous visit from the past 1095 days.    Cardiac Imaging:  No results found for this or any previous visit from the past 1095 days.      Inpatient Medications:  Scheduled medications   Medication Dose Route Frequency    aspirin  81 mg oral Daily    atenolol  25 mg oral Daily    atorvastatin  80 mg oral Nightly    enoxaparin  40 mg subcutaneous q24h    escitalopram  20 mg oral Daily    finasteride  5 mg oral Daily    lisinopril  10 mg oral  Daily    pantoprazole  40 mg oral Daily before breakfast    perflutren lipid microspheres  0.5-10 mL of dilution intravenous Once in imaging    tamsulosin  0.4 mg oral Daily     PRN medications   Medication    acetaminophen    ondansetron    polyethylene glycol    traZODone     Continuous Medications   Medication Dose Last Rate     Results for orders placed or performed during the hospital encounter of 04/10/25 (from the past 96 hours)   ECG 12 lead   Result Value Ref Range    Ventricular Rate 65 BPM    Atrial Rate 65 BPM    MD Interval 262 ms    QRS Duration 98 ms    QT Interval 428 ms    QTC Calculation(Bazett) 445 ms    P Axis 68 degrees    R Axis 163 degrees    T Axis 80 degrees    QRS Count 11 beats    Q Onset 216 ms    P Onset 85 ms    P Offset 153 ms    T Offset 430 ms    QTC Fredericia 439 ms   CBC and Auto Differential   Result Value Ref Range    WBC 7.8 4.4 - 11.3 x10*3/uL    nRBC 0.0 0.0 - 0.0 /100 WBCs    RBC 4.17 (L) 4.50 - 5.90 x10*6/uL    Hemoglobin 12.9 (L) 13.5 - 17.5 g/dL    Hematocrit 40.5 (L) 41.0 - 52.0 %    MCV 97 80 - 100 fL    MCH 30.9 26.0 - 34.0 pg    MCHC 31.9 (L) 32.0 - 36.0 g/dL    RDW 12.6 11.5 - 14.5 %    Platelets 183 150 - 450 x10*3/uL    Neutrophils % 69.6 40.0 - 80.0 %    Immature Granulocytes %, Automated 0.3 0.0 - 0.9 %    Lymphocytes % 19.5 13.0 - 44.0 %    Monocytes % 6.9 2.0 - 10.0 %    Eosinophils % 3.3 0.0 - 6.0 %    Basophils % 0.4 0.0 - 2.0 %    Neutrophils Absolute 5.43 1.60 - 5.50 x10*3/uL    Immature Granulocytes Absolute, Automated 0.02 0.00 - 0.50 x10*3/uL    Lymphocytes Absolute 1.52 0.80 - 3.00 x10*3/uL    Monocytes Absolute 0.54 0.05 - 0.80 x10*3/uL    Eosinophils Absolute 0.26 0.00 - 0.40 x10*3/uL    Basophils Absolute 0.03 0.00 - 0.10 x10*3/uL   Magnesium   Result Value Ref Range    Magnesium 1.81 1.60 - 2.40 mg/dL   Comprehensive metabolic panel   Result Value Ref Range    Glucose 136 (H) 74 - 99 mg/dL    Sodium 137 136 - 145 mmol/L    Potassium 4.1 3.5 - 5.3  mmol/L    Chloride 104 98 - 107 mmol/L    Bicarbonate 27 21 - 32 mmol/L    Anion Gap 10 10 - 20 mmol/L    Urea Nitrogen 21 6 - 23 mg/dL    Creatinine 1.07 0.50 - 1.30 mg/dL    eGFR 68 >60 mL/min/1.73m*2    Calcium 8.9 8.6 - 10.3 mg/dL    Albumin 4.3 3.4 - 5.0 g/dL    Alkaline Phosphatase 69 33 - 136 U/L    Total Protein 6.9 6.4 - 8.2 g/dL    AST 19 9 - 39 U/L    Bilirubin, Total 0.6 0.0 - 1.2 mg/dL    ALT 18 10 - 52 U/L   Troponin I, High Sensitivity   Result Value Ref Range    Troponin I, High Sensitivity 6 0 - 20 ng/L   B-Type Natriuretic Peptide   Result Value Ref Range    BNP 88 0 - 99 pg/mL   D-Dimer, VTE Exclusion   Result Value Ref Range    D-Dimer, Quantitative VTE Exclusion 480 <=500 ng/mL FEU   Troponin I, High Sensitivity   Result Value Ref Range    Troponin I, High Sensitivity 6 0 - 20 ng/L   Basic metabolic panel   Result Value Ref Range    Glucose 92 74 - 99 mg/dL    Sodium 140 136 - 145 mmol/L    Potassium 3.6 3.5 - 5.3 mmol/L    Chloride 104 98 - 107 mmol/L    Bicarbonate 29 21 - 32 mmol/L    Anion Gap 11 10 - 20 mmol/L    Urea Nitrogen 19 6 - 23 mg/dL    Creatinine 1.01 0.50 - 1.30 mg/dL    eGFR 72 >60 mL/min/1.73m*2    Calcium 8.8 8.6 - 10.3 mg/dL   Magnesium   Result Value Ref Range    Magnesium 1.77 1.60 - 2.40 mg/dL   CBC   Result Value Ref Range    WBC 6.1 4.4 - 11.3 x10*3/uL    nRBC 0.0 0.0 - 0.0 /100 WBCs    RBC 3.89 (L) 4.50 - 5.90 x10*6/uL    Hemoglobin 12.1 (L) 13.5 - 17.5 g/dL    Hematocrit 36.9 (L) 41.0 - 52.0 %    MCV 95 80 - 100 fL    MCH 31.1 26.0 - 34.0 pg    MCHC 32.8 32.0 - 36.0 g/dL    RDW 12.6 11.5 - 14.5 %    Platelets 177 150 - 450 x10*3/uL   Phosphorus   Result Value Ref Range    Phosphorus 2.8 2.5 - 4.9 mg/dL   Transthoracic Echo (TTE) Limited   Result Value Ref Range    LV Biplane EF 60 %    LVOT diam 1.90 cm    MV E/A ratio 0.83     Tricuspid annular plane systolic excursion 2.7 cm    LV EF 60 %    LVIDd 4.10 cm    LV A4C EF 62.4          Physical Exam:  Subjective:    Examination:   General Examination:   General Appearance: Well developed, well nourished, in no acute distress.   Head: normocephalic, atraumatic   Eyes: Anicteric sclera. Pupils are equally round and reactive to light.  Extraocular movements are intact.    Ears: normal   Oral: Cavity: mucosa moist.   Throat: clear.   Neck/Thyroid: neck supple, full range of motion, no cervical lymphadenopathy.   Skin: warm and dry, no suspicious lesions.    Heart: regular rate and rhythm, S1, S2 normal, no murmurs.   Lungs: clear to auscultation bilaterally.   Abdomen: soft, non-tender, non-distended, bowl sound present, normal.   Extremities: no clubbing, no cyanosis, no edema.   Neuro: non-focal, motor strength normal upper lower extremities, sensory exam intact.       Assessment/Plan   R07.9 chest pain atypical  I25.1 CAD s/p stenting in 2009  I10.0 HTN  J 44.9 copd    Echo stable  Outpatient followup and possible outpaitent stress, cleared for discharge      Code Status:  Full Code    I spent 60 minutes in the professional and overall care of this patient.        Dell Dailye MD

## 2025-04-11 NOTE — PROGRESS NOTES
04/11/25 1030   Discharge Planning   Living Arrangements Spouse/significant other;Family members   Support Systems Spouse/significant other;Family members   Type of Residence Private residence   Number of Stairs Within Residence   (denies difficulty)   Home or Post Acute Services None   Expected Discharge Disposition Home   Does the patient need discharge transport arranged? No   Financial Resource Strain   How hard is it for you to pay for the very basics like food, housing, medical care, and heating? Not very   Stroke Family Assessment   Stroke Family Assessment Needed No   Intensity of Service   Intensity of Service 0-30 min     Met with pt at bedside to review dc order. Pt lives at home with his wife and he is her primary caregiver. Their son is currently assisting with her care needs while pt is here at [facility]. The pt is independent. Drives to appointments and to get groceries. Uses a cane when outside of the home. Denies any falls. PCP is Dr. Taylor and prescriptions are filled at [facility] with no barriers noted. Pt denies any difficulty with living expenses such as utilities/groceries/prescriptions. Has a ride home upon dc. Cardiology on consult.

## 2025-04-14 ENCOUNTER — OFFICE VISIT (OUTPATIENT)
Dept: ORTHOPEDIC SURGERY | Facility: CLINIC | Age: 85
End: 2025-04-14
Payer: MEDICARE

## 2025-04-14 DIAGNOSIS — M25.569 KNEE PAIN, UNSPECIFIED CHRONICITY, UNSPECIFIED LATERALITY: Primary | ICD-10-CM

## 2025-04-14 PROCEDURE — 1159F MED LIST DOCD IN RCRD: CPT | Performed by: ORTHOPAEDIC SURGERY

## 2025-04-14 PROCEDURE — 20610 DRAIN/INJ JOINT/BURSA W/O US: CPT | Mod: 50 | Performed by: ORTHOPAEDIC SURGERY

## 2025-04-14 PROCEDURE — 1123F ACP DISCUSS/DSCN MKR DOCD: CPT | Performed by: ORTHOPAEDIC SURGERY

## 2025-04-14 PROCEDURE — 1036F TOBACCO NON-USER: CPT | Performed by: ORTHOPAEDIC SURGERY

## 2025-04-14 PROCEDURE — 1157F ADVNC CARE PLAN IN RCRD: CPT | Performed by: ORTHOPAEDIC SURGERY

## 2025-04-14 PROCEDURE — 2500000004 HC RX 250 GENERAL PHARMACY W/ HCPCS (ALT 636 FOR OP/ED): Performed by: ORTHOPAEDIC SURGERY

## 2025-04-14 PROCEDURE — 99211 OFF/OP EST MAY X REQ PHY/QHP: CPT | Performed by: ORTHOPAEDIC SURGERY

## 2025-04-14 RX ORDER — LIDOCAINE HYDROCHLORIDE 10 MG/ML
2 INJECTION, SOLUTION INFILTRATION; PERINEURAL
Status: COMPLETED | OUTPATIENT
Start: 2025-04-14 | End: 2025-04-14

## 2025-04-14 RX ORDER — TRIAMCINOLONE ACETONIDE 40 MG/ML
40 INJECTION, SUSPENSION INTRA-ARTICULAR; INTRAMUSCULAR
Status: COMPLETED | OUTPATIENT
Start: 2025-04-14 | End: 2025-04-14

## 2025-04-14 RX ADMIN — LIDOCAINE HYDROCHLORIDE 2 ML: 10 INJECTION, SOLUTION INFILTRATION; PERINEURAL at 15:27

## 2025-04-14 RX ADMIN — TRIAMCINOLONE ACETONIDE 40 MG: 40 INJECTION, SUSPENSION INTRA-ARTICULAR; INTRAMUSCULAR at 15:27

## 2025-04-14 NOTE — PROGRESS NOTES
History of Present Illness:  Patient returns today for  injections with corticosteroid. The patient endorsing bilateral  knee pain refractory to activity modifications and oral medications.    Review of Systems   GENERAL: Negative for malaise, significant weight loss, fever  MUSCULOSKELETAL: see HPI  NEURO:  Negative    Physical Examination:  Trace effusions  Tenderness over medial and lateral joint line    Assessment:  Patient with known osteoarthritis of the knees    Plan:  Corticosteroid injection provided, patient tolerated it well. See procedure note.    L Inj/Asp: bilateral knee on 4/14/2025 3:27 PM  Indications: pain  Details: 22 G needle, anteromedial approach  Medications (Right): 2 mL lidocaine 10 mg/mL (1 %); 40 mg triamcinolone acetonide 40 mg/mL  Medications (Left): 2 mL lidocaine 10 mg/mL (1 %); 40 mg triamcinolone acetonide 40 mg/mL  Outcome: tolerated well, no immediate complications  Procedure, treatment alternatives, risks and benefits explained, specific risks discussed. Consent was given by the patient. Immediately prior to procedure a time out was called to verify the correct patient, procedure, equipment, support staff and site/side marked as required. Patient was prepped and draped in the usual sterile fashion.

## 2025-04-15 ENCOUNTER — PATIENT OUTREACH (OUTPATIENT)
Dept: PRIMARY CARE | Facility: CLINIC | Age: 85
End: 2025-04-15
Payer: MEDICARE

## 2025-04-15 NOTE — PROGRESS NOTES
Discharge Facility: [facility]  Discharge Diagnosis: chest pain  Admission Date: 4/10/25  Discharge Date: 4/11/25    PCP Appointment Date:declined  Specialist Appointment Date:  TBD cardiology  Hospital Encounter and Summary Linked: Yes  Discharge Summary by Roberto Carlos Wang MD (04/11/2025 15:36)   See discharge assessment below for further details

## 2025-04-16 LAB
ATRIAL RATE: 65 BPM
P AXIS: 68 DEGREES
P OFFSET: 153 MS
P ONSET: 85 MS
PR INTERVAL: 262 MS
Q ONSET: 216 MS
QRS COUNT: 11 BEATS
QRS DURATION: 98 MS
QT INTERVAL: 428 MS
QTC CALCULATION(BAZETT): 445 MS
QTC FREDERICIA: 439 MS
R AXIS: 163 DEGREES
T AXIS: 80 DEGREES
T OFFSET: 430 MS
VENTRICULAR RATE: 65 BPM

## 2025-05-05 DIAGNOSIS — J43.9 PULMONARY EMPHYSEMA, UNSPECIFIED EMPHYSEMA TYPE (MULTI): Primary | ICD-10-CM

## 2025-05-13 DIAGNOSIS — F41.9 ANXIETY: ICD-10-CM

## 2025-05-14 RX ORDER — ESCITALOPRAM OXALATE 20 MG/1
20 TABLET ORAL DAILY
Qty: 90 TABLET | Refills: 3 | Status: SHIPPED | OUTPATIENT
Start: 2025-05-14

## 2025-05-18 DIAGNOSIS — I10 PRIMARY HYPERTENSION: ICD-10-CM

## 2025-05-19 RX ORDER — LISINOPRIL 10 MG/1
10 TABLET ORAL DAILY
Qty: 90 TABLET | Refills: 2 | Status: SHIPPED | OUTPATIENT
Start: 2025-05-19

## 2025-06-13 ENCOUNTER — APPOINTMENT (OUTPATIENT)
Dept: DERMATOLOGY | Facility: CLINIC | Age: 85
End: 2025-06-13
Payer: MEDICARE

## 2025-06-16 ENCOUNTER — OFFICE VISIT (OUTPATIENT)
Dept: DERMATOLOGY | Facility: CLINIC | Age: 85
End: 2025-06-16
Payer: MEDICARE

## 2025-06-16 DIAGNOSIS — L81.4 LENTIGO: ICD-10-CM

## 2025-06-16 DIAGNOSIS — L57.0 ACTINIC KERATOSES: ICD-10-CM

## 2025-06-16 DIAGNOSIS — D69.2 SOLAR PURPURA: ICD-10-CM

## 2025-06-16 DIAGNOSIS — L57.9 SKIN CHANGES DUE TO CHRONIC EXPOSURE TO NONIONIZING RADIATION: Primary | ICD-10-CM

## 2025-06-16 DIAGNOSIS — L82.1 SEBORRHEIC KERATOSIS: ICD-10-CM

## 2025-06-16 PROCEDURE — 1159F MED LIST DOCD IN RCRD: CPT | Performed by: STUDENT IN AN ORGANIZED HEALTH CARE EDUCATION/TRAINING PROGRAM

## 2025-06-16 PROCEDURE — 99213 OFFICE O/P EST LOW 20 MIN: CPT | Performed by: STUDENT IN AN ORGANIZED HEALTH CARE EDUCATION/TRAINING PROGRAM

## 2025-06-16 PROCEDURE — 17003 DESTRUCT PREMALG LES 2-14: CPT | Performed by: STUDENT IN AN ORGANIZED HEALTH CARE EDUCATION/TRAINING PROGRAM

## 2025-06-16 PROCEDURE — 17000 DESTRUCT PREMALG LESION: CPT | Performed by: STUDENT IN AN ORGANIZED HEALTH CARE EDUCATION/TRAINING PROGRAM

## 2025-06-16 NOTE — Clinical Note
Numerous ecchymotic patches    Discussed benign nature of condition, reassured. Reviewed warning signs of skin cancer with patient.  Due to sun damage, blood thinners, and minor trauma

## 2025-06-16 NOTE — PROGRESS NOTES
Subjective     Sidney Zapata is a 85 y.o. male who presents for the following: Skin Check (To face, Hx of AK's. LN2 last visit, good response. Bx in March to left nasal sidewall, seb. hyperplasia. ).          Review of Systems:  No other skin or systemic complaints other than what is documented elsewhere in the note.    The following portions of the chart were reviewed this encounter and updated as appropriate:          Skin Cancer History  Biopsy Log Book  Biopsied Type Location Status   3/18/25 Other Benign Neoplasm Left Nasal Sidewall No Treatment Required  3/21/25       Additional History      Specialty Problems          Dermatology Problems    Skin rash    Skin cancer screening        Objective   Well appearing patient in no apparent distress; mood and affect are within normal limits.    A focused skin examination was performed. All findings within normal limits unless otherwise noted below.    Assessment/Plan   Skin Exam  1. SKIN CHANGES DUE TO CHRONIC EXPOSURE TO NONIONIZING RADIATION  Generalized  Mottled pigmentation, telangiectasias and brown reticular macules in sun exposed areas on the face, extremities, trunk  The risk of chronic, cumulative sun damage and risk of development of skin cancer was reviewed with the patient today. Discussed important of sun protection with sun protective clothing and/or broad spectrum sunscreen spf 30 or above.  Warning signs of skin cancer were reviewed. Patient to contact office should they notice any new or changing pre-existing skin lesion.  2. SEBORRHEIC KERATOSIS  Generalized  Stuck on verrucous, tan-brown papules and plaques.    The benign nature of these skin lesions were reviewed with the patient today and reassurance was provided. Patient advised to return for f/u if the lesions change in size, shape, color, become painful, tender, itch or bleed.  3. LENTIGO  Generalized  Scattered tan macules in sun-exposed areas.  Benign nature of these skin lesions reviewed  and relation to sun exposure discussed. Reassurance provided. Reviewed warning signs of skin cancer.  4. ACTINIC KERATOSES (7)  Left Buccal Cheek, Left Ear, Left Preauricular Area, Mid Forehead, Right Ear (2), Right Forehead  Erythematous gritty macule(s)  Lesions are due to chronic, cumulative sun damage over time and are pre-cancerous. They have a risk of developing into squamous cell carcinoma and therefore treatment recommendations were offered and discussed with the patient. Discussed LN2 & topical chemotherapy creams, risks and benefits of each. The risks and benefits of LN2 were reviewed including incomplete removal, crusting, blister hypo and/or hyperpigmentation, scarring. The patient elected for LN2 today.  Destr of lesion - Left Buccal Cheek, Left Ear, Left Preauricular Area, Mid Forehead, Right Ear (2), Right Forehead  Complexity: simple    Destruction method: cryotherapy    Informed consent: discussed and consent obtained    Lesion destroyed using liquid nitrogen: Yes    Region frozen until ice ball extended beyond lesion: Yes    Cryotherapy cycles:  1  Outcome: patient tolerated procedure well with no complications    Post-procedure details: wound care instructions given    5. SOLAR PURPURA (2)  Left Forearm - Anterior, Right Forearm - Anterior  Numerous ecchymotic patches    Discussed benign nature of condition, reassured. Reviewed warning signs of skin cancer with patient.  Due to sun damage, blood thinners, and minor trauma

## 2025-08-12 ENCOUNTER — APPOINTMENT (OUTPATIENT)
Dept: PRIMARY CARE | Facility: CLINIC | Age: 85
End: 2025-08-12
Payer: MEDICARE

## 2025-08-12 VITALS
TEMPERATURE: 98.3 F | HEART RATE: 69 BPM | BODY MASS INDEX: 22.41 KG/M2 | RESPIRATION RATE: 18 BRPM | OXYGEN SATURATION: 96 % | WEIGHT: 180.2 LBS | HEIGHT: 75 IN | SYSTOLIC BLOOD PRESSURE: 123 MMHG | DIASTOLIC BLOOD PRESSURE: 58 MMHG

## 2025-08-12 DIAGNOSIS — Z00.00 ROUTINE GENERAL MEDICAL EXAMINATION AT HEALTH CARE FACILITY: ICD-10-CM

## 2025-08-12 DIAGNOSIS — M25.512 LEFT SHOULDER PAIN, UNSPECIFIED CHRONICITY: ICD-10-CM

## 2025-08-12 DIAGNOSIS — I10 PRIMARY HYPERTENSION: ICD-10-CM

## 2025-08-12 DIAGNOSIS — K22.719 BARRETT'S ESOPHAGUS WITH DYSPLASIA: ICD-10-CM

## 2025-08-12 DIAGNOSIS — Z98.61 CAD S/P PERCUTANEOUS CORONARY ANGIOPLASTY: ICD-10-CM

## 2025-08-12 DIAGNOSIS — R07.2 PRECORDIAL PAIN: Primary | ICD-10-CM

## 2025-08-12 DIAGNOSIS — M25.562 CHRONIC PAIN OF LEFT KNEE: ICD-10-CM

## 2025-08-12 DIAGNOSIS — E78.5 DYSLIPIDEMIA: ICD-10-CM

## 2025-08-12 DIAGNOSIS — G89.29 CHRONIC PAIN OF LEFT KNEE: ICD-10-CM

## 2025-08-12 DIAGNOSIS — I25.10 CAD S/P PERCUTANEOUS CORONARY ANGIOPLASTY: ICD-10-CM

## 2025-08-12 DIAGNOSIS — F33.1 MAJOR DEPRESSIVE DISORDER, RECURRENT, MODERATE: ICD-10-CM

## 2025-08-12 PROCEDURE — G2211 COMPLEX E/M VISIT ADD ON: HCPCS | Performed by: INTERNAL MEDICINE

## 2025-08-12 PROCEDURE — 1160F RVW MEDS BY RX/DR IN RCRD: CPT | Performed by: INTERNAL MEDICINE

## 2025-08-12 PROCEDURE — 1126F AMNT PAIN NOTED NONE PRSNT: CPT | Performed by: INTERNAL MEDICINE

## 2025-08-12 PROCEDURE — 99215 OFFICE O/P EST HI 40 MIN: CPT | Performed by: INTERNAL MEDICINE

## 2025-08-12 PROCEDURE — 3074F SYST BP LT 130 MM HG: CPT | Performed by: INTERNAL MEDICINE

## 2025-08-12 PROCEDURE — 1159F MED LIST DOCD IN RCRD: CPT | Performed by: INTERNAL MEDICINE

## 2025-08-12 PROCEDURE — 3078F DIAST BP <80 MM HG: CPT | Performed by: INTERNAL MEDICINE

## 2025-08-12 RX ORDER — AMINOPHYLLINE 25 MG/ML
125 INJECTION, SOLUTION INTRAVENOUS ONCE AS NEEDED
OUTPATIENT
Start: 2025-08-12

## 2025-08-12 RX ORDER — REGADENOSON 0.08 MG/ML
0.4 INJECTION, SOLUTION INTRAVENOUS
OUTPATIENT
Start: 2025-08-12

## 2025-08-12 ASSESSMENT — ENCOUNTER SYMPTOMS
EYE REDNESS: 0
COLOR CHANGE: 0
CONSTITUTIONAL NEGATIVE: 1
STRIDOR: 0
EYE DISCHARGE: 0
NAUSEA: 0
VOICE CHANGE: 0
EYE PAIN: 0
SEIZURES: 0
MYALGIAS: 0
HEMATOLOGIC/LYMPHATIC NEGATIVE: 1
NERVOUS/ANXIOUS: 0
SPEECH DIFFICULTY: 0
TROUBLE SWALLOWING: 0
PALPITATIONS: 0
PSYCHIATRIC NEGATIVE: 1
UNEXPECTED WEIGHT CHANGE: 0
WEAKNESS: 0
CHEST TIGHTNESS: 0
HALLUCINATIONS: 0
FACIAL ASYMMETRY: 0
CONSTIPATION: 0
NECK STIFFNESS: 0
SINUS PRESSURE: 0
CONFUSION: 0
LIGHT-HEADEDNESS: 0
APPETITE CHANGE: 0
TREMORS: 0
POLYDIPSIA: 0
ENDOCRINE NEGATIVE: 1
FREQUENCY: 0
WHEEZING: 0
SINUS PAIN: 0
WOUND: 0
GASTROINTESTINAL NEGATIVE: 1
BACK PAIN: 1
BLOOD IN STOOL: 0
SORE THROAT: 0
POLYPHAGIA: 0
ACTIVITY CHANGE: 0
DYSURIA: 0
AGITATION: 0
HEADACHES: 0
DIFFICULTY URINATING: 0
ARTHRALGIAS: 1
FLANK PAIN: 0
COUGH: 0
DIZZINESS: 0
SLEEP DISTURBANCE: 0
ADENOPATHY: 0
DIARRHEA: 0
RESPIRATORY NEGATIVE: 1
BRUISES/BLEEDS EASILY: 0
NUMBNESS: 0
VOMITING: 0
ABDOMINAL PAIN: 0
NECK PAIN: 0
ALLERGIC/IMMUNOLOGIC NEGATIVE: 1
EYES NEGATIVE: 1
SHORTNESS OF BREATH: 0
JOINT SWELLING: 0

## 2025-08-12 ASSESSMENT — PATIENT HEALTH QUESTIONNAIRE - PHQ9
2. FEELING DOWN, DEPRESSED OR HOPELESS: NOT AT ALL
1. LITTLE INTEREST OR PLEASURE IN DOING THINGS: NOT AT ALL
SUM OF ALL RESPONSES TO PHQ9 QUESTIONS 1 AND 2: 0

## 2025-08-12 ASSESSMENT — PAIN SCALES - GENERAL: PAINLEVEL_OUTOF10: 0-NO PAIN

## 2025-08-20 ENCOUNTER — APPOINTMENT (OUTPATIENT)
Dept: CARDIOLOGY | Facility: CLINIC | Age: 85
End: 2025-08-20
Payer: MEDICARE

## 2025-08-27 ENCOUNTER — HOSPITAL ENCOUNTER (OUTPATIENT)
Dept: CARDIOLOGY | Facility: HOSPITAL | Age: 85
Discharge: HOME | End: 2025-08-27
Payer: MEDICARE

## 2025-08-27 ENCOUNTER — TELEPHONE (OUTPATIENT)
Dept: PRIMARY CARE | Facility: CLINIC | Age: 85
End: 2025-08-27
Payer: MEDICARE

## 2025-08-27 ENCOUNTER — HOSPITAL ENCOUNTER (OUTPATIENT)
Dept: RADIOLOGY | Facility: HOSPITAL | Age: 85
Discharge: HOME | End: 2025-08-27
Payer: MEDICARE

## 2025-08-27 DIAGNOSIS — R07.2 PRECORDIAL PAIN: ICD-10-CM

## 2025-08-27 DIAGNOSIS — M25.512 LEFT SHOULDER PAIN, UNSPECIFIED CHRONICITY: ICD-10-CM

## 2025-08-27 DIAGNOSIS — Z98.61 CAD S/P PERCUTANEOUS CORONARY ANGIOPLASTY: ICD-10-CM

## 2025-08-27 DIAGNOSIS — I25.10 CAD S/P PERCUTANEOUS CORONARY ANGIOPLASTY: ICD-10-CM

## 2025-08-27 PROCEDURE — A9502 TC99M TETROFOSMIN: HCPCS | Performed by: INTERNAL MEDICINE

## 2025-08-27 PROCEDURE — 93016 CV STRESS TEST SUPVJ ONLY: CPT | Performed by: INTERNAL MEDICINE

## 2025-08-27 PROCEDURE — 3430000001 HC RX 343 DIAGNOSTIC RADIOPHARMACEUTICALS: Performed by: INTERNAL MEDICINE

## 2025-08-27 PROCEDURE — 93017 CV STRESS TEST TRACING ONLY: CPT

## 2025-08-27 PROCEDURE — 93018 CV STRESS TEST I&R ONLY: CPT | Performed by: INTERNAL MEDICINE

## 2025-08-27 PROCEDURE — 78452 HT MUSCLE IMAGE SPECT MULT: CPT | Performed by: RADIOLOGY

## 2025-08-27 PROCEDURE — 78452 HT MUSCLE IMAGE SPECT MULT: CPT

## 2025-08-27 RX ADMIN — TETROFOSMIN 8.2 MILLICURIE: 0.23 INJECTION, POWDER, LYOPHILIZED, FOR SOLUTION INTRAVENOUS at 10:05

## 2025-08-27 RX ADMIN — TETROFOSMIN 30.5 MILLICURIE: 0.23 INJECTION, POWDER, LYOPHILIZED, FOR SOLUTION INTRAVENOUS at 11:44

## 2025-09-03 ENCOUNTER — APPOINTMENT (OUTPATIENT)
Dept: CARDIOLOGY | Facility: CLINIC | Age: 85
End: 2025-09-03
Payer: MEDICARE

## 2025-09-03 PROBLEM — R93.1 ABNORMAL FINDINGS ON DIAGNOSTIC IMAGING OF HEART AND CORONARY CIRCULATION: Status: ACTIVE | Noted: 2025-09-03

## 2025-10-03 ENCOUNTER — APPOINTMENT (OUTPATIENT)
Dept: CARDIOLOGY | Facility: CLINIC | Age: 85
End: 2025-10-03
Payer: MEDICARE

## 2026-02-17 ENCOUNTER — APPOINTMENT (OUTPATIENT)
Dept: PRIMARY CARE | Facility: CLINIC | Age: 86
End: 2026-02-17
Payer: MEDICARE

## 2026-02-20 ENCOUNTER — APPOINTMENT (OUTPATIENT)
Dept: CARDIOLOGY | Facility: CLINIC | Age: 86
End: 2026-02-20
Payer: MEDICARE

## 2026-06-22 ENCOUNTER — APPOINTMENT (OUTPATIENT)
Dept: DERMATOLOGY | Facility: CLINIC | Age: 86
End: 2026-06-22
Payer: MEDICARE